# Patient Record
Sex: FEMALE | Race: WHITE | NOT HISPANIC OR LATINO | Employment: UNEMPLOYED | URBAN - METROPOLITAN AREA
[De-identification: names, ages, dates, MRNs, and addresses within clinical notes are randomized per-mention and may not be internally consistent; named-entity substitution may affect disease eponyms.]

---

## 2017-01-10 ENCOUNTER — GENERIC CONVERSION - ENCOUNTER (OUTPATIENT)
Dept: OTHER | Facility: OTHER | Age: 32
End: 2017-01-10

## 2018-01-15 NOTE — MISCELLANEOUS
Message   Recorded as Task   Date: 01/10/2017 02:56 PM, Created By: Renee Tracy   Task Name: Med Renewal Request   Assigned To: Renee Tracy   Regarding Patient: Mercedes Seen, Status: Active   CommentTalbert Spar - 10 Darwin 2017 2:56 PM     TASK CREATED  Caller: Self; [phone] (Home); [phone] (Work)  pt wants to go bk on byaz did not like nuvaring and husb is not going for vasectomy right now,rx to local pharm        Active Problems    1  Abnormal glucose in pregnancy, antepartum (648 83) (O99 810)   2  ASCUS favor benign (796 9)   3  Contraception (V25 9) (Z30 9)   4  Encounter for fetal anatomic survey (V28 81) (Z36)   5  Encounter for routine gynecological examination (V72 31) (Z01 419)   6  Encounter for routine postpartum follow-up (V24 2) (Z39 2)   7  Encounter for screening for risk of pre-term labor (V28 82) (Z36)   8  Gestational diabetes (648 80) (O24 419)   9  High-risk pregnancy supervision (V23 9) (O09 90)   10  History of gestational diabetes in prior pregnancy, currently pregnant (V23 49)    (O09 299,Z87 59)   6  History of  delivery, currently pregnant (V23 41) (O09 219)   12  Need for Tdap vaccination (V06 1) (Z23)   13  Pregnancy (V22 2) (Z33 1)   14  Pregnancy, subsequent (V22 1) (Z34 80)   15  Rh negative, antepartum (V23 89) (O09 899)    Current Meds   1  Beyaz 3-0 02-0 451 MG Oral Tablet (Drospiren-Eth Estrad-Levomefol); TAKE 1 TABLET   DAILY AS DIRECTED; Therapy: 02CDR5348 to (Evaluate:48Qpo7240); Last Rx:2015 Ordered   2  Beyaz 3-0 02-0 451 MG Oral Tablet; take 1 tablet every day as directed; Therapy: 81ENQ8506 to (Richard Parson)  Requested for: [id number]; Last   Rx:2015 Ordered   3  Flintstones Multivitamin CHEW;   Therapy: (Recorded:2014) to Recorded   4  Ibuprofen 600 MG Oral Tablet; Therapy: [id number] to Recorded    Allergies    1  Codeine Sulfate TABS    2  No Known Environmental Allergies   3   No Known Food Allergies    Plan  PMH: Contraceptives    · From  Beyaz 3-0 02-0 451 MG Oral Tablet take 1 tablet every day as directed  To Beyaz 3-0 02-0 451 MG Oral Tablet (Drospiren-Eth Estrad-Levomefol) TAKE 1  TABLET DAILY AS DIRECTED    Signatures   Electronically signed by : Alex Jacobson, ; Darwin 10 2017  2:56PM EST                       (Author)

## 2018-03-08 ENCOUNTER — OFFICE VISIT (OUTPATIENT)
Dept: OBGYN CLINIC | Facility: MEDICAL CENTER | Age: 33
End: 2018-03-08
Payer: COMMERCIAL

## 2018-03-08 VITALS
RESPIRATION RATE: 14 BRPM | SYSTOLIC BLOOD PRESSURE: 110 MMHG | DIASTOLIC BLOOD PRESSURE: 74 MMHG | WEIGHT: 134.2 LBS | BODY MASS INDEX: 26.35 KG/M2 | HEIGHT: 60 IN

## 2018-03-08 DIAGNOSIS — Z01.419 ENCOUNTER FOR ANNUAL ROUTINE GYNECOLOGICAL EXAMINATION: Primary | ICD-10-CM

## 2018-03-08 DIAGNOSIS — Z01.419 ENCOUNTER FOR GYNECOLOGICAL EXAMINATION WITHOUT ABNORMAL FINDING: ICD-10-CM

## 2018-03-08 PROCEDURE — G0145 SCR C/V CYTO,THINLAYER,RESCR: HCPCS | Performed by: OBSTETRICS & GYNECOLOGY

## 2018-03-08 PROCEDURE — 87624 HPV HI-RISK TYP POOLED RSLT: CPT | Performed by: OBSTETRICS & GYNECOLOGY

## 2018-03-08 PROCEDURE — 99395 PREV VISIT EST AGE 18-39: CPT | Performed by: OBSTETRICS & GYNECOLOGY

## 2018-03-08 NOTE — PROGRESS NOTES
Assessment/Plan:    No problem-specific Assessment & Plan notes found for this encounter  Diagnoses and all orders for this visit:    Encounter for annual routine gynecological examination  -     Liquid-based pap, screening          Subjective:      Patient ID: Jimena Carpenter is a 34 y o  female  Patient is a 39-year-old female, para 2, vasectomy here for yearly gynecologic exam without complaint  Review of systems is negative for vulvar vaginal or anal irritation  Negative for pelvic or abdominal pain  Negative for breast complaints  Past medical history is negative  Past surgical history is significant for an appendectomy and a   Current medications none no known drug allergies  Family history is significant for a paternal aunt with breast cancer  Gynecologic Exam   The patient's pertinent negatives include no pelvic pain or vaginal discharge  Pertinent negatives include no abdominal pain  The following portions of the patient's history were reviewed and updated as appropriate: past social history and problem list     Review of Systems   Gastrointestinal: Negative for abdominal pain  Genitourinary: Negative for pelvic pain, vaginal discharge and vaginal pain  Objective:      /74 (BP Location: Left arm, Patient Position: Sitting, Cuff Size: Standard)   Resp 14   Ht 5' (1 524 m)   Wt 60 9 kg (134 lb 3 2 oz)   LMP 2018 (Exact Date)   BMI 26 21 kg/m²          Physical Exam   Constitutional: She is oriented to person, place, and time  She appears well-developed and well-nourished  HENT:   Head: Normocephalic  Eyes: Pupils are equal, round, and reactive to light  Cardiovascular: Normal rate  Pulmonary/Chest: Effort normal and breath sounds normal  No respiratory distress  She has no wheezes  She has no rales  She exhibits no tenderness  Abdominal: Soft  She exhibits no distension and no mass  There is no tenderness   There is no rebound and no guarding  Genitourinary: Vagina normal and uterus normal  No vaginal discharge found  Neurological: She is alert and oriented to person, place, and time  Skin: Skin is warm  Psychiatric: She has a normal mood and affect   Her behavior is normal  Judgment and thought content normal

## 2018-03-13 LAB — HPV RRNA GENITAL QL NAA+PROBE: NORMAL

## 2018-03-15 LAB
LAB AP GYN PRIMARY INTERPRETATION: NORMAL
LAB AP LMP: NORMAL
Lab: NORMAL

## 2018-03-30 ENCOUNTER — TELEPHONE (OUTPATIENT)
Dept: OBGYN CLINIC | Facility: CLINIC | Age: 33
End: 2018-03-30

## 2018-03-30 NOTE — TELEPHONE ENCOUNTER
----- Message from Susu Raza MD sent at 3/15/2018  5:22 PM EDT -----  Please notify patient her Pap was normal on her screen for HPV was negative

## 2018-04-26 NOTE — TELEPHONE ENCOUNTER
I spoke to patient today to give her test results for her pap smear and HPV  In  which there were negative

## 2018-05-17 ENCOUNTER — OFFICE VISIT (OUTPATIENT)
Dept: FAMILY MEDICINE CLINIC | Facility: CLINIC | Age: 33
End: 2018-05-17
Payer: COMMERCIAL

## 2018-05-17 VITALS
HEART RATE: 64 BPM | SYSTOLIC BLOOD PRESSURE: 112 MMHG | TEMPERATURE: 98.6 F | WEIGHT: 130 LBS | DIASTOLIC BLOOD PRESSURE: 70 MMHG | HEIGHT: 60 IN | OXYGEN SATURATION: 98 % | RESPIRATION RATE: 16 BRPM | BODY MASS INDEX: 25.52 KG/M2

## 2018-05-17 DIAGNOSIS — Z86.32 HISTORY OF GESTATIONAL DIABETES: ICD-10-CM

## 2018-05-17 DIAGNOSIS — N91.1 AMENORRHEA, SECONDARY: ICD-10-CM

## 2018-05-17 DIAGNOSIS — Z00.00 ROUTINE ADULT HEALTH MAINTENANCE: Primary | ICD-10-CM

## 2018-05-17 DIAGNOSIS — L68.0 HIRSUTISM: ICD-10-CM

## 2018-05-17 PROCEDURE — 1036F TOBACCO NON-USER: CPT | Performed by: FAMILY MEDICINE

## 2018-05-17 PROCEDURE — 92551 PURE TONE HEARING TEST AIR: CPT | Performed by: FAMILY MEDICINE

## 2018-05-17 PROCEDURE — 3008F BODY MASS INDEX DOCD: CPT | Performed by: FAMILY MEDICINE

## 2018-05-17 PROCEDURE — 99173 VISUAL ACUITY SCREEN: CPT | Performed by: FAMILY MEDICINE

## 2018-05-17 PROCEDURE — 99214 OFFICE O/P EST MOD 30 MIN: CPT | Performed by: FAMILY MEDICINE

## 2018-05-17 PROCEDURE — 99385 PREV VISIT NEW AGE 18-39: CPT | Performed by: FAMILY MEDICINE

## 2018-05-17 RX ORDER — DROSPIRENONE, ETHINYL ESTRADIOL AND LEVOMEFOLATE CALCIUM AND LEVOMEFOLATE CALCIUM 3-0.02(24)
1 KIT ORAL DAILY
COMMUNITY
Start: 2015-10-08 | End: 2018-05-17 | Stop reason: ALTCHOICE

## 2018-05-17 NOTE — PROGRESS NOTES
Assessment/Plan:         Diagnoses and all orders for this visit:    Routine adult health maintenance  -     CBC and differential; Future  -     Comprehensive metabolic panel; Future  -     HEMOGLOBIN A1C W/ EAG ESTIMATION; Future  -     Lipid panel; Future  -     Estradiol; Future  -     Testosterone; Future  -     TSH, 3rd generation; Future  -     Lyme Antibody Profile with reflex to WB; Future  -     Follicle stimulating hormone; Future  -     Luteinizing hormone; Future    Hirsutism  -     CBC and differential; Future  -     Comprehensive metabolic panel; Future  -     HEMOGLOBIN A1C W/ EAG ESTIMATION; Future  -     Lipid panel; Future  -     Estradiol; Future  -     Testosterone; Future  -     TSH, 3rd generation; Future  -     Lyme Antibody Profile with reflex to WB; Future  -     Follicle stimulating hormone; Future  -     Luteinizing hormone; Future    Amenorrhea, secondary  -     CBC and differential; Future  -     Comprehensive metabolic panel; Future  -     HEMOGLOBIN A1C W/ EAG ESTIMATION; Future  -     Lipid panel; Future  -     Estradiol; Future  -     Testosterone; Future  -     TSH, 3rd generation; Future  -     Lyme Antibody Profile with reflex to WB; Future  -     Follicle stimulating hormone; Future  -     Luteinizing hormone; Future    History of gestational diabetes  -     CBC and differential; Future  -     Comprehensive metabolic panel; Future  -     HEMOGLOBIN A1C W/ EAG ESTIMATION; Future  -     Lipid panel; Future  -     Estradiol; Future  -     Testosterone; Future  -     TSH, 3rd generation; Future  -     Lyme Antibody Profile with reflex to WB; Future  -     Follicle stimulating hormone; Future  -     Luteinizing hormone; Future    Other orders  -     Discontinue: Drospiren-Eth Estrad-Levomefol (BEYAZ) 3-0 02-0 451 MG TABS; Take 1 tablet by mouth daily  -     Multiple Vitamins-Minerals (MULTIVITAMIN ADULT PO);  Take by mouth          Subjective:      Patient ID: Sheri Robles is a 34 y o  female  Here to establish care  c/o lump L side of neck, also no menses since February, only had some spotting last month  Late menarche at 24yo  Had stopped the pill in Sept/Oct 2017, and last menses was   Last pap was around that time as well  paps have been normal  Had no problem conceiving, but had thin cervix with the first preg 6 yo ago, 6 weeks bed rest until delivery, then needed to get shots weekly until 32 weeks to prevent  labor  The longest she has gone without a period is 6 months, occ bloating, cramps  Trying to improve her diet   had vasectomy in October  Noticing more hair growth on her arms, and around her lips, stomach area  Asks about PCOS, no hair loss  Has not checked a pregnancy test  No h/o thyroid or HTN issues in pregangy, but had GDM in at least the first pregnancy  No nipple discharge, no breast changes        The following portions of the patient's history were reviewed and updated as appropriate: allergies, current medications, past family history, past medical history, past social history, past surgical history and problem list     Review of Systems   Constitutional: Negative  HENT: Negative  Eyes: Negative  Respiratory: Negative  Cardiovascular: Negative  Gastrointestinal: Negative  Endocrine: Negative  Genitourinary: Positive for menstrual problem  Musculoskeletal: Negative  Skin: Negative  Allergic/Immunologic: Negative  Neurological: Negative  Hematological: Positive for adenopathy  Does not bruise/bleed easily  Psychiatric/Behavioral: Negative  Objective:      /70 (BP Location: Left arm, Patient Position: Sitting, Cuff Size: Standard)   Pulse 64   Temp 98 6 °F (37 °C)   Resp 16   Ht 5' (1 524 m)   Wt 59 kg (130 lb)   LMP 2018   SpO2 98%   BMI 25 39 kg/m²          Physical Exam   Constitutional: She is oriented to person, place, and time  She appears well-developed and well-nourished  HENT:   Right Ear: External ear normal    Left Ear: External ear normal    Eyes: Pupils are equal, round, and reactive to light  Neck: Normal range of motion  Neck supple  No thyromegaly present  Cardiovascular: Normal rate, regular rhythm and normal heart sounds  Pulmonary/Chest: Effort normal and breath sounds normal    Abdominal: Soft  Bowel sounds are normal    Musculoskeletal: Normal range of motion  Lymphadenopathy:     She has cervical adenopathy (Posterior L pea size lymph node, non tender, mobile)  Neurological: She is alert and oriented to person, place, and time  Skin: Skin is warm  Increased hair growth over the arms, belly, and around the lips   Psychiatric: She has a normal mood and affect   Her behavior is normal  Judgment and thought content normal

## 2018-12-26 ENCOUNTER — OFFICE VISIT (OUTPATIENT)
Dept: FAMILY MEDICINE CLINIC | Facility: CLINIC | Age: 33
End: 2018-12-26
Payer: COMMERCIAL

## 2018-12-26 VITALS
OXYGEN SATURATION: 97 % | WEIGHT: 129 LBS | TEMPERATURE: 99.4 F | DIASTOLIC BLOOD PRESSURE: 78 MMHG | RESPIRATION RATE: 16 BRPM | HEART RATE: 88 BPM | HEIGHT: 60 IN | BODY MASS INDEX: 25.32 KG/M2 | SYSTOLIC BLOOD PRESSURE: 116 MMHG

## 2018-12-26 DIAGNOSIS — J32.9 SINUSITIS, UNSPECIFIED CHRONICITY, UNSPECIFIED LOCATION: Primary | ICD-10-CM

## 2018-12-26 PROCEDURE — 99213 OFFICE O/P EST LOW 20 MIN: CPT | Performed by: INTERNAL MEDICINE

## 2018-12-26 RX ORDER — AMOXICILLIN 500 MG/1
500 CAPSULE ORAL EVERY 8 HOURS SCHEDULED
Qty: 30 CAPSULE | Refills: 0 | Status: SHIPPED | OUTPATIENT
Start: 2018-12-26 | End: 2019-01-05

## 2018-12-26 NOTE — PROGRESS NOTES
Assessment/Plan:         Diagnoses and all orders for this visit:    Sinusitis, unspecified chronicity, unspecified location  -     amoxicillin (AMOXIL) 500 mg capsule; Take 1 capsule (500 mg total) by mouth every 8 (eight) hours for 10 days          Subjective:      Patient ID: Kurt Rodriguez is a 28 y o  female  Ill x 3 days  Tried niquil   +sore throat +fever +head congestion ? Post nasal drip - cough  +swollen glands  Cough   Associated symptoms include a fever, headaches and a sore throat  The following portions of the patient's history were reviewed and updated as appropriate:   She  has a past medical history of Gestational diabetes and Type A blood, Rh negative  She There are no active problems to display for this patient  She  has a past surgical history that includes Appendectomy (2008)  Her family history includes Breast cancer in her paternal aunt; Diabetes in her maternal aunt, maternal grandmother, maternal uncle, and mother; Hypertension in her father, paternal grandfather, and paternal grandmother  She  reports that she has never smoked  She has never used smokeless tobacco  She reports that she does not drink alcohol or use drugs  Current Outpatient Prescriptions   Medication Sig Dispense Refill    amoxicillin (AMOXIL) 500 mg capsule Take 1 capsule (500 mg total) by mouth every 8 (eight) hours for 10 days 30 capsule 0    Multiple Vitamins-Minerals (MULTIVITAMIN ADULT PO) Take by mouth       No current facility-administered medications for this visit  Current Outpatient Prescriptions on File Prior to Visit   Medication Sig    Multiple Vitamins-Minerals (MULTIVITAMIN ADULT PO) Take by mouth     No current facility-administered medications on file prior to visit  She is allergic to codeine       Review of Systems   Constitutional: Positive for fever  HENT: Positive for congestion and sore throat  Respiratory: Negative for cough  Cardiovascular: Negative  Neurological: Positive for headaches  Objective:      /78 (BP Location: Left arm, Patient Position: Sitting, Cuff Size: Standard)   Pulse 88   Temp 99 4 °F (37 4 °C) (Tympanic)   Resp 16   Ht 5' (1 524 m)   Wt 58 5 kg (129 lb)   SpO2 97%   BMI 25 19 kg/m²          Physical Exam   Constitutional: She appears well-developed and well-nourished  HENT:   Head: Normocephalic and atraumatic  Right Ear: External ear normal    Left Ear: External ear normal    Nose: Nose normal    Mouth/Throat: Oropharynx is clear and moist    Neck: Normal range of motion  Neck supple  Cardiovascular: Normal rate, regular rhythm and normal heart sounds  Pulmonary/Chest: Effort normal and breath sounds normal    Abdominal: Soft   Bowel sounds are normal

## 2020-01-30 ENCOUNTER — ANNUAL EXAM (OUTPATIENT)
Dept: OBGYN CLINIC | Facility: MEDICAL CENTER | Age: 35
End: 2020-01-30
Payer: COMMERCIAL

## 2020-01-30 VITALS — SYSTOLIC BLOOD PRESSURE: 132 MMHG | WEIGHT: 132 LBS | BODY MASS INDEX: 25.78 KG/M2 | DIASTOLIC BLOOD PRESSURE: 82 MMHG

## 2020-01-30 DIAGNOSIS — Z01.419 ENCOUNTER FOR GYNECOLOGICAL EXAMINATION (GENERAL) (ROUTINE) WITHOUT ABNORMAL FINDINGS: Primary | ICD-10-CM

## 2020-01-30 PROCEDURE — 99395 PREV VISIT EST AGE 18-39: CPT | Performed by: NURSE PRACTITIONER

## 2020-01-30 NOTE — PROGRESS NOTES
Assessment/Plan:    Encounter for gynecological examination (general) (routine) without abnormal findings  Normal findings on routine gyn exam  Advised monthly SBE, annual CBE and baseline screening mammo at age 35  Reviewed ASCCP guidelines and recommended pap with cotesting q3 yrs for this low risk patient; this was noted to be up to date  STI testing was offered and the patient declines; she reports low risk  The patient reports vas for contra  Diet/activity recommendations:  Encouraged daily Ca++ and vitamin D intake as well as daily weight bearing exercise for promotion of bone health  RTO in one year or sooner PRN  Diagnoses and all orders for this visit:    Encounter for gynecological examination (general) (routine) without abnormal findings          Subjective:      Patient ID: Chidi Rodriguez is a 27 y o  female  This patient presents for routine annual gyn exam    Medically stable  She denies acute gyn complaints  Menses are regular and light to mod  She denies pelvic pain, breast concerns, abnormal discharge, bowel/bladder dysfunction, depression/anxiety   and monogamous  She denies STI concerns  Vas for contraception  Kids are well - son is 15 and daughter is 4      The following portions of the patient's history were reviewed and updated as appropriate: allergies, current medications, past family history, past medical history, past social history, past surgical history and problem list     Review of Systems   Constitutional: Negative  Respiratory: Negative  Cardiovascular: Negative  Gastrointestinal: Negative  Genitourinary: Negative  Musculoskeletal: Negative  Skin: Negative  Neurological: Negative  Psychiatric/Behavioral: Negative  Objective:      /82   Wt 59 9 kg (132 lb)   LMP 01/07/2020   BMI 25 78 kg/m²          Physical Exam   Constitutional: She is oriented to person, place, and time  She appears well-developed and well-nourished  HENT:   Head: Normocephalic and atraumatic  Eyes: Pupils are equal, round, and reactive to light  EOM are normal    Neck: Normal range of motion  Neck supple  No thyromegaly present  Cardiovascular: Normal rate, regular rhythm and normal heart sounds  Pulmonary/Chest: Effort normal and breath sounds normal  No respiratory distress  She has no wheezes  She has no rales  She exhibits no mass, no tenderness and no deformity  Right breast exhibits no inverted nipple, no mass, no nipple discharge, no skin change and no tenderness  Left breast exhibits no inverted nipple, no mass, no nipple discharge, no skin change and no tenderness  No breast tenderness or discharge  Breasts are symmetrical    Abdominal: Soft  She exhibits no distension and no mass  There is no splenomegaly or hepatomegaly  There is no tenderness  There is no rebound and no guarding  Genitourinary: Rectum normal, vagina normal and uterus normal  No breast tenderness or discharge  No labial fusion  There is no rash, tenderness, lesion or injury on the right labia  There is no rash, tenderness, lesion or injury on the left labia  Cervix exhibits no motion tenderness, no discharge and no friability  Right adnexum displays no mass, no tenderness and no fullness  Left adnexum displays no mass, no tenderness and no fullness  No erythema, tenderness or bleeding in the vagina  No foreign body in the vagina  No vaginal discharge found  Musculoskeletal: Normal range of motion  Lymphadenopathy:     She has no cervical adenopathy  She has no axillary adenopathy  Neurological: She is alert and oriented to person, place, and time  No cranial nerve deficit  Skin: Skin is warm and dry  No rash noted  No cyanosis  Nails show no clubbing  Psychiatric: She has a normal mood and affect   Her speech is normal and behavior is normal  Judgment and thought content normal  Cognition and memory are normal

## 2020-01-30 NOTE — ASSESSMENT & PLAN NOTE
Normal findings on routine gyn exam  Advised monthly SBE, annual CBE and baseline screening mammo at age 35  Reviewed ASCCP guidelines and recommended pap with cotesting q3 yrs for this low risk patient; this was noted to be up to date  STI testing was offered and the patient declines; she reports low risk  The patient reports vas for contra  Diet/activity recommendations:  Encouraged daily Ca++ and vitamin D intake as well as daily weight bearing exercise for promotion of bone health  RTO in one year or sooner PRN

## 2020-02-06 ENCOUNTER — OFFICE VISIT (OUTPATIENT)
Dept: FAMILY MEDICINE CLINIC | Facility: CLINIC | Age: 35
End: 2020-02-06
Payer: COMMERCIAL

## 2020-02-06 VITALS
OXYGEN SATURATION: 98 % | HEIGHT: 60 IN | WEIGHT: 135 LBS | DIASTOLIC BLOOD PRESSURE: 70 MMHG | HEART RATE: 78 BPM | TEMPERATURE: 97.1 F | BODY MASS INDEX: 26.5 KG/M2 | SYSTOLIC BLOOD PRESSURE: 118 MMHG

## 2020-02-06 DIAGNOSIS — Z86.32 HISTORY OF GESTATIONAL DIABETES: ICD-10-CM

## 2020-02-06 DIAGNOSIS — L68.0 HIRSUTISM: ICD-10-CM

## 2020-02-06 DIAGNOSIS — Z00.00 ANNUAL PHYSICAL EXAM: Primary | ICD-10-CM

## 2020-02-06 DIAGNOSIS — S93.491A SPRAIN OF POSTERIOR TALOFIBULAR LIGAMENT OF RIGHT ANKLE, INITIAL ENCOUNTER: ICD-10-CM

## 2020-02-06 PROBLEM — O24.419 GESTATIONAL DIABETES: Status: ACTIVE | Noted: 2020-02-06

## 2020-02-06 PROCEDURE — 99173 VISUAL ACUITY SCREEN: CPT | Performed by: FAMILY MEDICINE

## 2020-02-06 PROCEDURE — 99395 PREV VISIT EST AGE 18-39: CPT | Performed by: FAMILY MEDICINE

## 2020-02-06 PROCEDURE — 92551 PURE TONE HEARING TEST AIR: CPT | Performed by: FAMILY MEDICINE

## 2020-02-06 PROCEDURE — 1036F TOBACCO NON-USER: CPT | Performed by: FAMILY MEDICINE

## 2020-02-06 PROCEDURE — 3008F BODY MASS INDEX DOCD: CPT | Performed by: FAMILY MEDICINE

## 2020-02-06 PROCEDURE — 99214 OFFICE O/P EST MOD 30 MIN: CPT | Performed by: FAMILY MEDICINE

## 2020-02-06 NOTE — PROGRESS NOTES
Assessment/Plan:    1  Annual physical exam  -     CBC and differential; Future  -     Comprehensive metabolic panel; Future  -     Lipid panel; Future    2  Hirsutism  Comments:  check labs, consider endo or GYN consult  Orders:  -     TSH, 3rd generation; Future  -     Testosterone, free, total; Future  -     Estradiol; Future  -     HEMOGLOBIN A1C W/ EAG ESTIMATION; Future  -     Follicle stimulating hormone; Future  -     Luteinizing hormone; Future  -     Prolactin; Future  -     Cortisol Level, AM Specimen; Future    3  Sprain of posterior talofibular ligament of right ankle, initial encounter    4  History of gestational diabetes          Patient Instructions       Wellness Visit for Adults   AMBULATORY CARE:   A wellness visit  is when you see your healthcare provider to get screened for health problems  You can also get advice on how to stay healthy  Write down your questions so you remember to ask them  Ask your healthcare provider how often you should have a wellness visit  What happens at a wellness visit:  Your healthcare provider will ask about your health, and your family history of health problems  This includes high blood pressure, heart disease, and cancer  He or she will ask if you have symptoms that concern you, if you smoke, and about your mood  You may also be asked about your intake of medicines, supplements, food, and alcohol  Any of the following may be done:  · Your weight  will be checked  Your height may also be checked so your body mass index (BMI) can be calculated  Your BMI shows if you are at a healthy weight  · Your blood pressure  and heart rate will be checked  Your temperature may also be checked  · Blood and urine tests  may be done  Blood tests may be done to check your cholesterol levels  Abnormal cholesterol levels increase your risk for heart disease and stroke  You may also need a blood or urine test to check for diabetes if you are at increased risk   Urine tests may be done to look for signs of an infection or kidney disease  · A physical exam  includes checking your heartbeat and lungs with a stethoscope  Your healthcare provider may also check your skin to look for sun damage  · Screening tests  may be recommended  A screening test is done to check for diseases that may not cause symptoms  The screening tests you may need depend on your age, gender, family history, and lifestyle habits  For example, colorectal screening may be recommended if you are 48years old or older  Screening tests you need if you are a woman:   · A Pap smear  is used to screen for cervical cancer  Pap smears are usually done every 3 to 5 years depending on your age  You may need them more often if you have had abnormal Pap smear test results in the past  Ask your healthcare provider how often you should have a Pap smear  · A mammogram  is an x-ray of your breasts to screen for breast cancer  Experts recommend mammograms every 2 years starting at age 48 years  You may need a mammogram at age 52 years or younger if you have an increased risk for breast cancer  Talk to your healthcare provider about when you should start having mammograms and how often you need them  Vaccines you may need:   · Get an influenza vaccine  every year  The influenza vaccine protects you from the flu  Several types of viruses cause the flu  The viruses change over time, so new vaccines are made each year  · Get a tetanus-diphtheria (Td) booster vaccine  every 10 years  This vaccine protects you against tetanus and diphtheria  Tetanus is a severe infection that may cause painful muscle spasms and lockjaw  Diphtheria is a severe bacterial infection that causes a thick covering in the back of your mouth and throat  · Get a human papillomavirus (HPV) vaccine  if you are female and aged 23 to 32 or male 23 to 24 and never received it  This vaccine protects you from HPV infection   HPV is the most common infection spread by sexual contact  HPV may also cause vaginal, penile, and anal cancers  · Get a pneumococcal vaccine  if you are aged 72 years or older  The pneumococcal vaccine is an injection given to protect you from pneumococcal disease  Pneumococcal disease is an infection caused by pneumococcal bacteria  The infection may cause pneumonia, meningitis, or an ear infection  · Get a shingles vaccine  if you are aged 61 or older, even if you have had shingles before  The shingles vaccine is an injection to protect you from the varicella-zoster virus  This is the same virus that causes chickenpox  Shingles is a painful rash that develops in people who had chickenpox or have been exposed to the virus  How to eat healthy:  My Plate is a model for planning healthy meals  It shows the types and amounts of foods that should go on your plate  Fruits and vegetables make up about half of your plate, and grains and protein make up the other half  A serving of dairy is included on the side of your plate  The amount of calories and serving sizes you need depends on your age, gender, weight, and height  Examples of healthy foods are listed below:  · Eat a variety of vegetables  such as dark green, red, and orange vegetables  You can also include canned vegetables low in sodium (salt) and frozen vegetables without added butter or sauces  · Eat a variety of fresh fruits , canned fruit in 100% juice, frozen fruit, and dried fruit  · Include whole grains  At least half of the grains you eat should be whole grains  Examples include whole-wheat bread, wheat pasta, brown rice, and whole-grain cereals such as oatmeal     · Eat a variety of protein foods such as seafood (fish and shellfish), lean meat, and poultry without skin (turkey and chicken)  Examples of lean meats include pork leg, shoulder, or tenderloin, and beef round, sirloin, tenderloin, and extra lean ground beef   Other protein foods include eggs and egg substitutes, beans, peas, soy products, nuts, and seeds  · Choose low-fat dairy products such as skim or 1% milk or low-fat yogurt, cheese, and cottage cheese  · Limit unhealthy fats  such as butter, hard margarine, and shortening  Exercise:  Exercise at least 30 minutes per day on most days of the week  Some examples of exercise include walking, biking, dancing, and swimming  You can also fit in more physical activity by taking the stairs instead of the elevator or parking farther away from stores  Include muscle strengthening activities 2 days each week  Regular exercise provides many health benefits  It helps you manage your weight, and decreases your risk for type 2 diabetes, heart disease, stroke, and high blood pressure  Exercise can also help improve your mood  Ask your healthcare provider about the best exercise plan for you  General health and safety guidelines:   · Do not smoke  Nicotine and other chemicals in cigarettes and cigars can cause lung damage  Ask your healthcare provider for information if you currently smoke and need help to quit  E-cigarettes or smokeless tobacco still contain nicotine  Talk to your healthcare provider before you use these products  · Limit alcohol  A drink of alcohol is 12 ounces of beer, 5 ounces of wine, or 1½ ounces of liquor  · Lose weight, if needed  Being overweight increases your risk of certain health conditions  These include heart disease, high blood pressure, type 2 diabetes, and certain types of cancer  · Protect your skin  Do not sunbathe or use tanning beds  Use sunscreen with a SPF 15 or higher  Apply sunscreen at least 15 minutes before you go outside  Reapply sunscreen every 2 hours  Wear protective clothing, hats, and sunglasses when you are outside  · Drive safely  Always wear your seatbelt  Make sure everyone in your car wears a seatbelt  A seatbelt can save your life if you are in an accident   Do not use your cell phone when you are driving  This could distract you and cause an accident  Pull over if you need to make a call or send a text message  · Practice safe sex  Use latex condoms if are sexually active and have more than one partner  Your healthcare provider may recommend screening tests for sexually transmitted infections (STIs)  · Wear helmets, lifejackets, and protective gear  Always wear a helmet when you ride a bike or motorcycle, go skiing, or play sports that could cause a head injury  Wear protective equipment when you play sports  Wear a lifejacket when you are on a boat or doing water sports  © 2017 [facility] Information is for End User's use only and may not be sold, redistributed or otherwise used for commercial purposes  All illustrations and images included in CareNotes® are the copyrighted property of [employer] , [employer]  or Germán Irvin  The above information is an  only  It is not intended as medical advice for individual conditions or treatments  Talk to your doctor, nurse or pharmacist before following any medical regimen to see if it is safe and effective for you  Return if symptoms worsen or fail to improve  Subjective:      Patient ID: Roxanne Hirsch is a 27 y o  female  Chief Complaint   Patient presents with    Annual Exam    Ankle Pain       Here for R ankle pain, 1 1/2 weeks ago while exercising at home, without shoes  Didn't hurt until the next day  Able to bear weight, but more swollen in the evening, driving a lot with kids schedules  Hasn't tried a brace  Also asks about hormone check  Notes hair growth on her abdomen, after her first pregnancy 12 years ago, then it subsided and has started up again on the abd and the chin, hairy arms as well  H/o GDM with her first pregnancy  Second pregnancy 4 yrs ago, she was more conscious of her sugar  Has cut out soda, exercises, no change in her weight despite changed habits   Notes some trouble conceiving her 2yo dtr, but did not use any additional meds or treatments  Periods have always been abnormal, no OCP, pt   has vasectomy, denies buffalo hump  The following portions of the patient's history were reviewed and updated as appropriate:  past social history    Review of Systems   Constitutional: Negative for fatigue and fever  HENT: Negative for congestion  Eyes: Negative for visual disturbance  Respiratory: Negative for chest tightness and shortness of breath  Cardiovascular: Positive for leg swelling  Negative for chest pain and palpitations  Gastrointestinal: Negative for abdominal pain  Genitourinary: Positive for menstrual problem (irreg)  Negative for difficulty urinating  Musculoskeletal: Positive for arthralgias and joint swelling  Neurological: Negative for numbness and headaches  Hematological: Does not bruise/bleed easily  Psychiatric/Behavioral: Negative for dysphoric mood and sleep disturbance  The patient is not nervous/anxious  Current Outpatient Medications   Medication Sig Dispense Refill    Multiple Vitamins-Minerals (MULTIVITAMIN ADULT PO) Take by mouth       No current facility-administered medications for this visit  Objective:    /70 (BP Location: Right arm, Patient Position: Sitting, Cuff Size: Standard)   Pulse 78   Temp (!) 97 1 °F (36 2 °C)   Ht 5' (1 524 m)   Wt 61 2 kg (135 lb)   LMP 01/07/2020   SpO2 98%   BMI 26 37 kg/m²      Physical Exam   Constitutional: She is oriented to person, place, and time  She appears well-developed and well-nourished  Cardiovascular: Normal rate, regular rhythm and normal heart sounds  Pulmonary/Chest: Effort normal and breath sounds normal  She exhibits no tenderness  Abdominal: Soft  She exhibits no distension  There is no tenderness     Musculoskeletal: She exhibits tenderness (below R late malleolus, no bony tenderness anywhere on the foot, good ROM but pain with inversion R side)  She exhibits no deformity  Neurological: She is alert and oriented to person, place, and time  Skin: Skin is warm  Psychiatric: She has a normal mood and affect   Her behavior is normal  Judgment and thought content normal            Dequan Kennedy MD

## 2020-02-06 NOTE — PATIENT INSTRUCTIONS

## 2020-02-06 NOTE — PROGRESS NOTES
[address] 57    NAME: Rena Alonso  AGE: 27 y o  SEX: female  : 1985     DATE: 2020     Assessment and Plan:     Problem List Items Addressed This Visit        Other    History of gestational diabetes      Other Visit Diagnoses     Annual physical exam    -  Primary    Relevant Orders    CBC and differential    Comprehensive metabolic panel    Lipid panel    Hirsutism        check labs, consider endo or GYN consult    Relevant Orders    TSH, 3rd generation    Testosterone, free, total    Estradiol    HEMOGLOBIN A1C W/ EAG ESTIMATION    Follicle stimulating hormone    Luteinizing hormone    Prolactin    Cortisol Level, AM Specimen    Sprain of posterior talofibular ligament of right ankle, initial encounter              Immunizations and preventive care screenings were discussed with patient today  Appropriate education was printed on patient's after visit summary  Counseling:  Dental Health: discussed importance of regular tooth brushing, flossing, and dental visits  · Exercise: the importance of regular exercise/physical activity was discussed  Recommend exercise 3-5 times per week for at least 30 minutes  Return if symptoms worsen or fail to improve  Chief Complaint:     Chief Complaint   Patient presents with    Annual Exam    Ankle Pain      History of Present Illness:     Adult Annual Physical   Patient here for a comprehensive physical exam  The patient reports problems - R ankle pain  Diet and Physical Activity  · Diet/Nutrition: well balanced diet, low carb diet and consuming 3-5 servings of fruits/vegetables daily  · Exercise: walking, moderate cardiovascular exercise, strength training exercises, 3-4 times a week on average and 30-60 minutes on average        Depression Screening  PHQ-9 Depression Screening    PHQ-9:    Frequency of the following problems over the past two weeks:       Little interest or pleasure in doing things:  0 - not at all  Feeling down, depressed, or hopeless:  0 - not at all  PHQ-2 Score:  0       General Health  · Sleep: sleeps well and gets 7-8 hours of sleep on average  · Hearing: normal - bilateral   · Vision: goes for regular eye exams, wears glasses and wears contacts  · Dental: regular dental visits and brushes teeth twice daily  /GYN Health  · Last menstrual period: 1/14/20  · Contraceptive method:  vasectomy  · History of STDs?: no      Review of Systems:     Review of Systems   Constitutional: Negative for fatigue and fever  HENT: Negative for congestion  Eyes: Negative for visual disturbance  Respiratory: Negative for chest tightness and shortness of breath  Cardiovascular: Negative for chest pain and palpitations  Gastrointestinal: Negative for abdominal pain  Genitourinary: Negative for difficulty urinating  Musculoskeletal: Positive for arthralgias  Neurological: Negative for headaches  Hematological: Does not bruise/bleed easily  Psychiatric/Behavioral: Negative for sleep disturbance        Past Medical History:     Past Medical History:   Diagnosis Date    Gestational diabetes     Type A blood, Rh negative       Past Surgical History:     Past Surgical History:   Procedure Laterality Date    APPENDECTOMY  2008      Social History:     Social History     Socioeconomic History    Marital status: /Civil Union     Spouse name: None    Number of children: None    Years of education: None    Highest education level: None   Occupational History    None   Social Needs    Financial resource strain: None    Food insecurity:     Worry: None     Inability: None    Transportation needs:     Medical: None     Non-medical: None   Tobacco Use    Smoking status: Never Smoker    Smokeless tobacco: Never Used   Substance and Sexual Activity    Alcohol use: No     Comment: Per [employer]: Alcohol use    Drug use: No    Sexual activity: Yes     Partners: Male     Birth control/protection: Male Sterilization     Comment:    Lifestyle    Physical activity:     Days per week: None     Minutes per session: None    Stress: None   Relationships    Social connections:     Talks on phone: None     Gets together: None     Attends Orthodox service: None     Active member of club or organization: None     Attends meetings of clubs or organizations: None     Relationship status: None    Intimate partner violence:     Fear of current or ex partner: None     Emotionally abused: None     Physically abused: None     Forced sexual activity: None   Other Topics Concern    None   Social History Narrative    Always uses a seatbelt    Coffee    Per [employer]: Single      Family History:     Family History   Problem Relation Age of Onset    Diabetes Mother     Hypertension Father     Diabetes Maternal Grandmother     Breast cancer Paternal Aunt     Hypertension Paternal Grandmother     Hypertension Paternal Grandfather     Diabetes Maternal Aunt     Diabetes Maternal Uncle       Current Medications:     Current Outpatient Medications   Medication Sig Dispense Refill    Multiple Vitamins-Minerals (MULTIVITAMIN ADULT PO) Take by mouth       No current facility-administered medications for this visit  Allergies: Allergies   Allergen Reactions    Codeine Vomiting     Reaction Date: 08Feb2012;       Physical Exam:     /70 (BP Location: Right arm, Patient Position: Sitting, Cuff Size: Standard)   Pulse 78   Temp (!) 97 1 °F (36 2 °C)   Ht 5' (1 524 m)   Wt 61 2 kg (135 lb)   LMP 01/07/2020   SpO2 98%   BMI 26 37 kg/m²     Physical Exam   Constitutional: She is oriented to person, place, and time  She appears well-developed and well-nourished     HENT:   Right Ear: External ear normal    Left Ear: External ear normal    Mouth/Throat: Oropharynx is clear and moist    Eyes: Pupils are equal, round, and reactive to light  Conjunctivae and EOM are normal    Neck: Normal range of motion  Neck supple  Cardiovascular: Normal rate, regular rhythm and normal heart sounds  Pulmonary/Chest: Effort normal and breath sounds normal    Abdominal: Soft  Bowel sounds are normal    Musculoskeletal: Normal range of motion  She exhibits tenderness (R lat malleolus, tender below, no bony tenderness)  Neurological: She is alert and oriented to person, place, and time  She has normal reflexes  She displays normal reflexes  Coordination normal    Skin: Skin is warm  Psychiatric: She has a normal mood and affect  Her behavior is normal  Judgment and thought content normal    Vitals reviewed          Hearing Screening    125Hz 250Hz 500Hz 1000Hz 2000Hz 3000Hz 4000Hz 6000Hz 8000Hz   Right ear:   20 20 20  20     Left ear:   20 20 20  20        Visual Acuity Screening    Right eye Left eye Both eyes   Without correction:      With correction: 20/20 20/20 20/20       MD Don[facility]

## 2020-07-11 ENCOUNTER — APPOINTMENT (OUTPATIENT)
Dept: LAB | Facility: MEDICAL CENTER | Age: 35
End: 2020-07-11
Payer: COMMERCIAL

## 2020-07-11 DIAGNOSIS — L68.0 HIRSUTISM: ICD-10-CM

## 2020-07-11 DIAGNOSIS — Z00.00 ANNUAL PHYSICAL EXAM: ICD-10-CM

## 2020-07-11 LAB
ALBUMIN SERPL BCP-MCNC: 4 G/DL (ref 3.5–5)
ALP SERPL-CCNC: 58 U/L (ref 46–116)
ALT SERPL W P-5'-P-CCNC: 18 U/L (ref 12–78)
ANION GAP SERPL CALCULATED.3IONS-SCNC: 2 MMOL/L (ref 4–13)
AST SERPL W P-5'-P-CCNC: 12 U/L (ref 5–45)
BASOPHILS # BLD AUTO: 0.03 THOUSANDS/ΜL (ref 0–0.1)
BASOPHILS NFR BLD AUTO: 0 % (ref 0–1)
BILIRUB SERPL-MCNC: 0.68 MG/DL (ref 0.2–1)
BUN SERPL-MCNC: 12 MG/DL (ref 5–25)
CALCIUM SERPL-MCNC: 8.9 MG/DL (ref 8.3–10.1)
CHLORIDE SERPL-SCNC: 103 MMOL/L (ref 100–108)
CHOLEST SERPL-MCNC: 161 MG/DL (ref 50–200)
CO2 SERPL-SCNC: 31 MMOL/L (ref 21–32)
CORTIS AM PEAK SERPL-MCNC: 13.6 UG/DL (ref 4.2–22.4)
CREAT SERPL-MCNC: 0.77 MG/DL (ref 0.6–1.3)
EOSINOPHIL # BLD AUTO: 0.2 THOUSAND/ΜL (ref 0–0.61)
EOSINOPHIL NFR BLD AUTO: 3 % (ref 0–6)
ERYTHROCYTE [DISTWIDTH] IN BLOOD BY AUTOMATED COUNT: 12.8 % (ref 11.6–15.1)
EST. AVERAGE GLUCOSE BLD GHB EST-MCNC: 117 MG/DL
ESTRADIOL SERPL-MCNC: 349 PG/ML
FSH SERPL-ACNC: 10.2 MIU/ML
GFR SERPL CREATININE-BSD FRML MDRD: 100 ML/MIN/1.73SQ M
GLUCOSE P FAST SERPL-MCNC: 93 MG/DL (ref 65–99)
HBA1C MFR BLD: 5.7 %
HCT VFR BLD AUTO: 40.8 % (ref 34.8–46.1)
HDLC SERPL-MCNC: 56 MG/DL
HGB BLD-MCNC: 13.2 G/DL (ref 11.5–15.4)
IMM GRANULOCYTES # BLD AUTO: 0.02 THOUSAND/UL (ref 0–0.2)
IMM GRANULOCYTES NFR BLD AUTO: 0 % (ref 0–2)
LDLC SERPL CALC-MCNC: 93 MG/DL (ref 0–100)
LH SERPL-ACNC: 75 MIU/ML
LYMPHOCYTES # BLD AUTO: 2.13 THOUSANDS/ΜL (ref 0.6–4.47)
LYMPHOCYTES NFR BLD AUTO: 29 % (ref 14–44)
MCH RBC QN AUTO: 30.4 PG (ref 26.8–34.3)
MCHC RBC AUTO-ENTMCNC: 32.4 G/DL (ref 31.4–37.4)
MCV RBC AUTO: 94 FL (ref 82–98)
MONOCYTES # BLD AUTO: 0.37 THOUSAND/ΜL (ref 0.17–1.22)
MONOCYTES NFR BLD AUTO: 5 % (ref 4–12)
NEUTROPHILS # BLD AUTO: 4.51 THOUSANDS/ΜL (ref 1.85–7.62)
NEUTS SEG NFR BLD AUTO: 63 % (ref 43–75)
NONHDLC SERPL-MCNC: 105 MG/DL
NRBC BLD AUTO-RTO: 0 /100 WBCS
PLATELET # BLD AUTO: 253 THOUSANDS/UL (ref 149–390)
PMV BLD AUTO: 10.9 FL (ref 8.9–12.7)
POTASSIUM SERPL-SCNC: 4.2 MMOL/L (ref 3.5–5.3)
PROLACTIN SERPL-MCNC: 20.5 NG/ML
PROT SERPL-MCNC: 7 G/DL (ref 6.4–8.2)
RBC # BLD AUTO: 4.34 MILLION/UL (ref 3.81–5.12)
SODIUM SERPL-SCNC: 136 MMOL/L (ref 136–145)
TRIGL SERPL-MCNC: 62 MG/DL
TSH SERPL DL<=0.05 MIU/L-ACNC: 2.29 UIU/ML (ref 0.36–3.74)
WBC # BLD AUTO: 7.26 THOUSAND/UL (ref 4.31–10.16)

## 2020-07-11 PROCEDURE — 36415 COLL VENOUS BLD VENIPUNCTURE: CPT

## 2020-07-11 PROCEDURE — 80061 LIPID PANEL: CPT

## 2020-07-11 PROCEDURE — 83001 ASSAY OF GONADOTROPIN (FSH): CPT

## 2020-07-11 PROCEDURE — 82533 TOTAL CORTISOL: CPT

## 2020-07-11 PROCEDURE — 85025 COMPLETE CBC W/AUTO DIFF WBC: CPT

## 2020-07-11 PROCEDURE — 84403 ASSAY OF TOTAL TESTOSTERONE: CPT

## 2020-07-11 PROCEDURE — 82670 ASSAY OF TOTAL ESTRADIOL: CPT

## 2020-07-11 PROCEDURE — 84146 ASSAY OF PROLACTIN: CPT

## 2020-07-11 PROCEDURE — 83036 HEMOGLOBIN GLYCOSYLATED A1C: CPT

## 2020-07-11 PROCEDURE — 84402 ASSAY OF FREE TESTOSTERONE: CPT

## 2020-07-11 PROCEDURE — 84443 ASSAY THYROID STIM HORMONE: CPT

## 2020-07-11 PROCEDURE — 83002 ASSAY OF GONADOTROPIN (LH): CPT

## 2020-07-11 PROCEDURE — 80053 COMPREHEN METABOLIC PANEL: CPT

## 2020-07-14 DIAGNOSIS — L68.0 HIRSUTISM: Primary | ICD-10-CM

## 2020-07-14 DIAGNOSIS — R79.89 ELEVATED TESTOSTERONE LEVEL IN FEMALE: ICD-10-CM

## 2020-07-14 LAB
TESTOST FREE SERPL-MCNC: 7.1 PG/ML (ref 0–4.2)
TESTOST SERPL-MCNC: 62 NG/DL (ref 8–48)

## 2020-07-17 ENCOUNTER — TELEPHONE (OUTPATIENT)
Dept: FAMILY MEDICINE CLINIC | Facility: CLINIC | Age: 35
End: 2020-07-17

## 2020-07-17 NOTE — TELEPHONE ENCOUNTER
Pt was returning our call for her lab results  I gave pt notes as documented by Dr Marcy Lu  Pt stated she received a call from [facility] & will follow up with them to schedule an appt

## 2021-04-21 ENCOUNTER — ANNUAL EXAM (OUTPATIENT)
Dept: OBGYN CLINIC | Facility: MEDICAL CENTER | Age: 36
End: 2021-04-21
Payer: COMMERCIAL

## 2021-04-21 VITALS — SYSTOLIC BLOOD PRESSURE: 130 MMHG | DIASTOLIC BLOOD PRESSURE: 72 MMHG | WEIGHT: 131 LBS | BODY MASS INDEX: 25.58 KG/M2

## 2021-04-21 DIAGNOSIS — L68.0 HIRSUTISM: ICD-10-CM

## 2021-04-21 DIAGNOSIS — Z01.419 ENCOUNTER FOR GYNECOLOGICAL EXAMINATION (GENERAL) (ROUTINE) WITHOUT ABNORMAL FINDINGS: Primary | ICD-10-CM

## 2021-04-21 DIAGNOSIS — Z12.4 ENCOUNTER FOR PAPANICOLAOU SMEAR FOR CERVICAL CANCER SCREENING: ICD-10-CM

## 2021-04-21 DIAGNOSIS — Z11.51 SCREENING FOR HPV (HUMAN PAPILLOMAVIRUS): ICD-10-CM

## 2021-04-21 PROBLEM — Z86.32 HISTORY OF GESTATIONAL DIABETES: Status: RESOLVED | Noted: 2020-02-06 | Resolved: 2021-04-21

## 2021-04-21 PROCEDURE — G0145 SCR C/V CYTO,THINLAYER,RESCR: HCPCS | Performed by: NURSE PRACTITIONER

## 2021-04-21 PROCEDURE — 87624 HPV HI-RISK TYP POOLED RSLT: CPT | Performed by: NURSE PRACTITIONER

## 2021-04-21 PROCEDURE — 99395 PREV VISIT EST AGE 18-39: CPT | Performed by: NURSE PRACTITIONER

## 2021-04-21 PROCEDURE — 1036F TOBACCO NON-USER: CPT | Performed by: NURSE PRACTITIONER

## 2021-04-21 NOTE — ASSESSMENT & PLAN NOTE
Longstanding h/o mild hirsutism  Reviewed possible causes incl but not limited to genetic predisposition, elevated testosterone  Discussed lab work up for hyperandrogenism and options for medical management   She declines work up at this time and is considering laser hair removal

## 2021-04-21 NOTE — ASSESSMENT & PLAN NOTE
Normal findings on routine gyn exam  Advised monthly SBE, annual CBE and baseline screening mammo at age 35  Reviewed ASCCP guidelines and recommended pap with cotesting q3 yrs for this low risk patient; this was collected today  STI testing was offered and the patient declines; she reports low risk  The patient reports vas for contraception  Diet/activity recommendations:  Encouraged daily Ca++ and vitamin D intake as well as daily weight bearing exercise for promotion of bone health  RTO in one year or sooner PRN

## 2021-04-21 NOTE — PROGRESS NOTES
Assessment/Plan:    Encounter for gynecological examination (general) (routine) without abnormal findings  Normal findings on routine gyn exam  Advised monthly SBE, annual CBE and baseline screening mammo at age 35  Reviewed ASCCP guidelines and recommended pap with cotesting q3 yrs for this low risk patient; this was collected today  STI testing was offered and the patient declines; she reports low risk  The patient reports vas for contraception  Diet/activity recommendations:  Encouraged daily Ca++ and vitamin D intake as well as daily weight bearing exercise for promotion of bone health  RTO in one year or sooner PRN  Hirsutism  Longstanding h/o mild hirsutism  Reviewed possible causes incl but not limited to genetic predisposition, elevated testosterone  Discussed lab work up for hyperandrogenism and options for medical management  She declines work up at this time and is considering laser hair removal         Diagnoses and all orders for this visit:    Encounter for gynecological examination (general) (routine) without abnormal findings    Encounter for Papanicolaou smear for cervical cancer screening  -     Liquid-based pap, screening    Screening for HPV (human papillomavirus)  -     Liquid-based pap, screening    Hirsutism          Subjective:      Patient ID: Elise Solorzano is a 38 y o  female  This patient presents for routine annual gyn exam    Medically stable   C/o thick hair growth on arms, a bit on lower belly and also a bit under chin  She is interested in most natural methods for management  Denies acne, h/o irreg menses  She denies acute gyn complaints  Menses are regular and light to mod  She denies pelvic pain, breast concerns, abnormal discharge, bowel/bladder dysfunction, depression/anxiety   and monogamous  She denies STI concerns  Vas for contraception       Kids are well - daughter is 11 and son is 15   They are going to [facility] for vacation this summer         The following portions of the patient's history were reviewed and updated as appropriate: allergies, current medications, past family history, past medical history, past social history, past surgical history and problem list     Review of Systems   Constitutional: Negative  Respiratory: Negative  Cardiovascular: Negative  Gastrointestinal: Negative  Genitourinary: Negative  Musculoskeletal: Negative  Skin: Negative  Hair growth over arms, chi, lower belly    Neurological: Negative  Psychiatric/Behavioral: Negative  Objective:      /72   Wt 59 4 kg (131 lb)   LMP 04/14/2021   BMI 25 58 kg/m²          Physical Exam  Constitutional:       Appearance: She is well-developed  HENT:      Head: Normocephalic and atraumatic  Eyes:      Pupils: Pupils are equal, round, and reactive to light  Neck:      Musculoskeletal: Normal range of motion and neck supple  Thyroid: No thyromegaly  Cardiovascular:      Rate and Rhythm: Normal rate and regular rhythm  Heart sounds: Normal heart sounds  Pulmonary:      Effort: Pulmonary effort is normal  No respiratory distress  Breath sounds: Normal breath sounds  No wheezing or rales  Chest:      Chest wall: No mass, deformity or tenderness  Breasts: Breasts are symmetrical          Right: No inverted nipple, mass, nipple discharge, skin change or tenderness  Left: No inverted nipple, mass, nipple discharge, skin change or tenderness  Abdominal:      General: There is no distension  Palpations: Abdomen is soft  There is no hepatomegaly, splenomegaly or mass  Tenderness: There is no abdominal tenderness  There is no guarding or rebound  Genitourinary:     Labia:         Right: No rash, tenderness, lesion or injury  Left: No rash, tenderness, lesion or injury  Vagina: Normal  No foreign body  No vaginal discharge, erythema, tenderness or bleeding        Cervix: No cervical motion tenderness, discharge or friability  Uterus: Normal        Adnexa:         Right: No mass, tenderness or fullness  Left: No mass, tenderness or fullness  Rectum: Normal    Musculoskeletal: Normal range of motion  Lymphadenopathy:      Cervical: No cervical adenopathy  Skin:     General: Skin is warm and dry  Findings: No rash  Nails: There is no clubbing  Neurological:      Mental Status: She is alert and oriented to person, place, and time  Cranial Nerves: No cranial nerve deficit  Psychiatric:         Speech: Speech normal          Behavior: Behavior normal          Thought Content:  Thought content normal          Judgment: Judgment normal

## 2021-04-22 LAB
HPV HR 12 DNA CVX QL NAA+PROBE: NEGATIVE
HPV16 DNA CVX QL NAA+PROBE: NEGATIVE
HPV18 DNA CVX QL NAA+PROBE: NEGATIVE

## 2021-04-27 LAB
LAB AP GYN PRIMARY INTERPRETATION: NORMAL
Lab: NORMAL

## 2022-04-08 ENCOUNTER — OFFICE VISIT (OUTPATIENT)
Dept: FAMILY MEDICINE CLINIC | Facility: CLINIC | Age: 37
End: 2022-04-08
Payer: COMMERCIAL

## 2022-04-08 VITALS
DIASTOLIC BLOOD PRESSURE: 72 MMHG | BODY MASS INDEX: 25.32 KG/M2 | RESPIRATION RATE: 16 BRPM | HEART RATE: 79 BPM | WEIGHT: 129 LBS | OXYGEN SATURATION: 98 % | HEIGHT: 60 IN | SYSTOLIC BLOOD PRESSURE: 126 MMHG | TEMPERATURE: 98.2 F

## 2022-04-08 DIAGNOSIS — K59.09 OTHER CONSTIPATION: ICD-10-CM

## 2022-04-08 DIAGNOSIS — K21.9 GASTROESOPHAGEAL REFLUX DISEASE WITHOUT ESOPHAGITIS: Primary | ICD-10-CM

## 2022-04-08 DIAGNOSIS — R35.0 INCREASED FREQUENCY OF URINATION: ICD-10-CM

## 2022-04-08 PROCEDURE — 3725F SCREEN DEPRESSION PERFORMED: CPT | Performed by: FAMILY MEDICINE

## 2022-04-08 PROCEDURE — 1036F TOBACCO NON-USER: CPT | Performed by: FAMILY MEDICINE

## 2022-04-08 PROCEDURE — 99214 OFFICE O/P EST MOD 30 MIN: CPT | Performed by: FAMILY MEDICINE

## 2022-04-08 PROCEDURE — 3008F BODY MASS INDEX DOCD: CPT | Performed by: FAMILY MEDICINE

## 2022-04-08 RX ORDER — FAMOTIDINE 20 MG/1
20 TABLET, FILM COATED ORAL 2 TIMES DAILY
Qty: 30 TABLET | Refills: 1 | Status: SHIPPED | OUTPATIENT
Start: 2022-04-08 | End: 2022-06-22 | Stop reason: ALTCHOICE

## 2022-04-08 RX ORDER — FAMOTIDINE 20 MG/1
20 TABLET, FILM COATED ORAL 2 TIMES DAILY
Qty: 30 TABLET | Refills: 1 | Status: SHIPPED | OUTPATIENT
Start: 2022-04-08 | End: 2022-04-08 | Stop reason: SDUPTHER

## 2022-04-08 NOTE — ASSESSMENT & PLAN NOTE
Patient's history of gestational diabetes reported increased frequency urination  Patient's last hemoglobin A1c was 5 7  Will repeat at this time together with CMP and lipid panel for evaluation of diabetes

## 2022-04-08 NOTE — ASSESSMENT & PLAN NOTE
Patient does have nausea, indigestion especially at night likely secondary to acid reflux  In regards to her diet, food such as garlic, onions, leak or tomatoes and other acidic food may increase the risk of acid reflux  Do not lie down or to crying immediately after you eat  Take time with food, to not over the stump itself  Try to aim for about 80% full  If possible, to change the largest meal of the day from dinner to lunch  Of void eating especially snacking late at night    Will start patient on famotidine 20 mg daily for the next months    If he noticed significantly worsening pain, nausea and vomiting or no bowel movement in greater than 3 days please return for evaluation

## 2022-04-08 NOTE — PROGRESS NOTES
Assessment/Plan:    Gastroesophageal reflux disease without esophagitis  Patient does have nausea, indigestion especially at night likely secondary to acid reflux  In regards to her diet, food such as garlic, onions, leak or tomatoes and other acidic food may increase the risk of acid reflux  Do not lie down or to crying immediately after you eat  Take time with food, to not over the stump itself  Try to aim for about 80% full  If possible, to change the largest meal of the day from dinner to lunch  Of void eating especially snacking late at night    Will start patient on famotidine 20 mg daily for the next months    If he noticed significantly worsening pain, nausea and vomiting or no bowel movement in greater than 3 days please return for evaluation    Other constipation  Patient disclosed best from every day however has occasional hard stools that is painful to push out  This is likely due to dehydration rather than to a motility issue  Includes patient maintained adequate hydration with at least 64 oz of water a day  Will rule out other causes with TSH    Increased frequency of urination  Patient's history of gestational diabetes reported increased frequency urination  Patient's last hemoglobin A1c was 5 7  Will repeat at this time together with CMP and lipid panel for evaluation of diabetes      BMI Counseling: Body mass index is 25 19 kg/m²  The BMI is above normal  Nutrition recommendations include reducing portion sizes, 3-5 servings of fruits/vegetables daily and consuming healthier snacks  Exercise recommendations include moderate aerobic physical activity for 150 minutes/week  Subjective:      Patient ID: Chhaya De La Rosa is a 39 y o  female  HPI    44-year-old female patient presents with concern regarding abdominal bloating and discomfort  Patient has similar symptoms for a few months now but has recently noticed increased worsening symptoms    Patient reports she is having some difficulty sleeping at night due to acid reflux symptoms including nausea, acidic taste associated indigestion  She also has some pain in her left lower quadrant, cardiac rates about 3 to 4/10  Patient is having bowel movement once a day but states the bowel movement can occasionally be hard and difficult to push out  Review of Systems   Constitutional: Negative for chills and fever  HENT: Negative for congestion, rhinorrhea and sore throat  Respiratory: Negative for shortness of breath  Cardiovascular: Negative for chest pain  Gastrointestinal: Positive for abdominal distention and abdominal pain  Negative for constipation, diarrhea, nausea and vomiting  Genitourinary: Negative for dysuria and hematuria  Neurological: Negative for headaches  Psychiatric/Behavioral: Negative for sleep disturbance  Objective:    /72 (BP Location: Left arm, Patient Position: Sitting, Cuff Size: Standard)   Pulse 79   Temp 98 2 °F (36 8 °C) (Temporal)   Resp 16   Ht 5' (1 524 m)   Wt 58 5 kg (129 lb)   SpO2 98%   BMI 25 19 kg/m²       Physical Exam  Vitals reviewed  Constitutional:       General: She is not in acute distress  Appearance: She is well-developed  She is not ill-appearing or toxic-appearing  Cardiovascular:      Rate and Rhythm: Normal rate and regular rhythm  Pulses: Normal pulses  Heart sounds: Normal heart sounds  No murmur heard  Pulmonary:      Effort: Pulmonary effort is normal  No respiratory distress  Breath sounds: Normal breath sounds  Abdominal:      General: Abdomen is flat  Bowel sounds are normal  There is no distension  Palpations: Abdomen is soft  There is no shifting dullness  Musculoskeletal:         General: No swelling, tenderness, deformity or signs of injury  Normal range of motion  Skin:     General: Skin is warm and dry  Capillary Refill: Capillary refill takes less than 2 seconds  Coloration: Skin is not jaundiced  Neurological:      General: No focal deficit present  Mental Status: She is alert and oriented to person, place, and time  Psychiatric:         Mood and Affect: Mood normal           JUAN C Henriquez  [facility]    Please excuse any "sound-alike" errors that may have ocurred during the process of dictation  Parts of this note have been dictated and there may be errors present in the transcription process  Thank you

## 2022-04-08 NOTE — ASSESSMENT & PLAN NOTE
Patient disclosed best from every day however has occasional hard stools that is painful to push out  This is likely due to dehydration rather than to a motility issue  Includes patient maintained adequate hydration with at least 64 oz of water a day  Will rule out other causes with TSH

## 2022-05-19 ENCOUNTER — OFFICE VISIT (OUTPATIENT)
Dept: FAMILY MEDICINE CLINIC | Facility: CLINIC | Age: 37
End: 2022-05-19
Payer: COMMERCIAL

## 2022-05-19 VITALS
HEART RATE: 74 BPM | BODY MASS INDEX: 25.56 KG/M2 | HEIGHT: 60 IN | SYSTOLIC BLOOD PRESSURE: 114 MMHG | DIASTOLIC BLOOD PRESSURE: 78 MMHG | TEMPERATURE: 98 F | OXYGEN SATURATION: 98 % | WEIGHT: 130.2 LBS

## 2022-05-19 DIAGNOSIS — R35.0 INCREASED FREQUENCY OF URINATION: ICD-10-CM

## 2022-05-19 DIAGNOSIS — Z00.00 ANNUAL PHYSICAL EXAM: Primary | ICD-10-CM

## 2022-05-19 LAB
SL AMB  POCT GLUCOSE, UA: NORMAL
SL AMB LEUKOCYTE ESTERASE,UA: NORMAL
SL AMB POCT BILIRUBIN,UA: NORMAL
SL AMB POCT BLOOD,UA: NORMAL
SL AMB POCT COLOR,UA: YELLOW
SL AMB POCT KETONES,UA: NORMAL
SL AMB POCT NITRITE,UA: NORMAL
SL AMB POCT PH,UA: 7
SL AMB POCT SPECIFIC GRAVITY,UA: 1
SL AMB POCT URINE PROTEIN: NORMAL
SL AMB POCT UROBILINOGEN: NORMAL

## 2022-05-19 PROCEDURE — 81002 URINALYSIS NONAUTO W/O SCOPE: CPT | Performed by: FAMILY MEDICINE

## 2022-05-19 PROCEDURE — 87086 URINE CULTURE/COLONY COUNT: CPT | Performed by: FAMILY MEDICINE

## 2022-05-19 PROCEDURE — 1036F TOBACCO NON-USER: CPT | Performed by: FAMILY MEDICINE

## 2022-05-19 PROCEDURE — 3008F BODY MASS INDEX DOCD: CPT | Performed by: FAMILY MEDICINE

## 2022-05-19 PROCEDURE — 99395 PREV VISIT EST AGE 18-39: CPT | Performed by: FAMILY MEDICINE

## 2022-05-19 NOTE — PROGRESS NOTES
[facility]    NAME: Nabil Stephen  AGE: 39 y o  SEX: female  : 1985     DATE: 2022     Assessment and Plan:     Problem List Items Addressed This Visit    None     Visit Diagnoses     Annual physical exam    -  Primary    lab orders in, will call with results          Immunizations and preventive care screenings were discussed with patient today  Appropriate education was printed on patient's after visit summary  Counseling:  Dental Health: discussed importance of regular tooth brushing, flossing, and dental visits  · Exercise: the importance of regular exercise/physical activity was discussed  Recommend exercise 3-5 times per week for at least 30 minutes  No follow-ups on file  Chief Complaint:     Chief Complaint   Patient presents with    Annual Exam      History of Present Illness:     Adult Annual Physical   Patient here for a comprehensive physical exam  The patient reports no problems  Diet and Physical Activity  · Diet/Nutrition: well balanced diet and consuming 3-5 servings of fruits/vegetables daily  · Exercise: walking, strength training exercises, 1-2 times a week on average and 30-60 minutes on average  Depression Screening  PHQ-2/9 Depression Screening         General Health  · Sleep: sleeps well and gets 7-8 hours of sleep on average  · Hearing: normal - bilateral   · Vision: goes for regular eye exams, most recent eye exam <1 year ago and wears contacts  · Dental: regular dental visits  /GYN Health  · Last menstrual period: 22  · Contraceptive method: barrier methods   vasectomy  · History of STDs?: no   · Denies DV, adeq calcium     Review of Systems:     Review of Systems   Constitutional: Negative for fatigue and fever  HENT: Negative for congestion  Eyes: Negative for visual disturbance     Respiratory: Negative for chest tightness and shortness of breath  Cardiovascular: Negative for chest pain and palpitations  Gastrointestinal: Negative for abdominal pain, constipation and diarrhea  Genitourinary: Negative for difficulty urinating and menstrual problem  Musculoskeletal: Negative for arthralgias  Skin: Negative for rash  Neurological: Negative for headaches  Hematological: Does not bruise/bleed easily  Psychiatric/Behavioral: Negative for dysphoric mood  The patient is not nervous/anxious         Past Medical History:     Past Medical History:   Diagnosis Date    Gestational diabetes     Type A blood, Rh negative       Past Surgical History:     Past Surgical History:   Procedure Laterality Date    APPENDECTOMY  2008      Social History:     Social History     Socioeconomic History    Marital status: /Civil Union     Spouse name: None    Number of children: None    Years of education: None    Highest education level: None   Occupational History    None   Tobacco Use    Smoking status: Never Smoker    Smokeless tobacco: Never Used   Vaping Use    Vaping Use: Never used   Substance and Sexual Activity    Alcohol use: Yes     Comment: occ    Drug use: No    Sexual activity: Yes     Partners: Male     Birth control/protection: Male Sterilization     Comment:    Other Topics Concern    None   Social History Narrative    Always uses a seatbelt    Coffee    Per [employer]: Single     Social Determinants of Health     Financial Resource Strain: Not on file   Food Insecurity: Not on file   Transportation Needs: Not on file   Physical Activity: Not on file   Stress: Not on file   Social Connections: Not on file   Intimate Partner Violence: Not on file   Housing Stability: Not on file      Family History:     Family History   Problem Relation Age of Onset    No Known Problems Mother     Hypertension Father     No Known Problems Sister     No Known Problems Brother     Diabetes Maternal Grandmother     Hypertension Paternal Grandmother     Hypertension Paternal Grandfather     Diabetes Maternal Aunt     Diabetes Maternal Uncle     Breast cancer Paternal Aunt       Current Medications:     Current Outpatient Medications   Medication Sig Dispense Refill    famotidine (PEPCID) 20 mg tablet Take 1 tablet (20 mg total) by mouth 2 (two) times a day 30 tablet 1    MULTIPLE VITAMINS PO Take by mouth      Multiple Vitamins-Minerals (MULTIVITAMIN ADULT PO) Take by mouth       No current facility-administered medications for this visit  Allergies: Allergies   Allergen Reactions    Codeine Vomiting     Reaction Date: [id number];       Physical Exam:     /78 (BP Location: Right arm, Patient Position: Sitting, Cuff Size: Standard)   Pulse 74   Temp 98 °F (36 7 °C)   Ht 5' (1 524 m)   Wt 59 1 kg (130 lb 3 2 oz)   LMP 04/27/2022   SpO2 98%   BMI 25 43 kg/m²     Physical Exam  Vitals reviewed  Constitutional:       Appearance: Normal appearance  She is well-developed and normal weight  HENT:      Right Ear: Tympanic membrane, ear canal and external ear normal       Left Ear: Tympanic membrane, ear canal and external ear normal       Mouth/Throat:      Mouth: Mucous membranes are moist       Pharynx: Oropharynx is clear  Eyes:      Conjunctiva/sclera: Conjunctivae normal       Pupils: Pupils are equal, round, and reactive to light  Cardiovascular:      Rate and Rhythm: Normal rate and regular rhythm  Heart sounds: Normal heart sounds  Pulmonary:      Effort: Pulmonary effort is normal       Breath sounds: Normal breath sounds  Abdominal:      General: Bowel sounds are normal       Palpations: Abdomen is soft  Tenderness: There is no abdominal tenderness  Hernia: No hernia is present  Musculoskeletal:         General: Normal range of motion  Cervical back: Normal range of motion and neck supple  Right lower leg: No edema  Left lower leg: No edema     Lymphadenopathy: Cervical: No cervical adenopathy  Skin:     General: Skin is warm  Capillary Refill: Capillary refill takes less than 2 seconds  Neurological:      General: No focal deficit present  Mental Status: She is alert and oriented to person, place, and time  Psychiatric:         Mood and Affect: Mood normal          Behavior: Behavior normal          Thought Content:  Thought content normal          Judgment: Judgment normal           Gege Caldwell MD   [facility]

## 2022-05-19 NOTE — PATIENT INSTRUCTIONS

## 2022-05-22 LAB — BACTERIA UR CULT: NORMAL

## 2022-06-14 ENCOUNTER — OFFICE VISIT (OUTPATIENT)
Dept: OBGYN CLINIC | Facility: MEDICAL CENTER | Age: 37
End: 2022-06-14
Payer: COMMERCIAL

## 2022-06-14 VITALS
SYSTOLIC BLOOD PRESSURE: 118 MMHG | BODY MASS INDEX: 25.17 KG/M2 | WEIGHT: 128.2 LBS | DIASTOLIC BLOOD PRESSURE: 80 MMHG | HEIGHT: 60 IN

## 2022-06-14 DIAGNOSIS — N64.4 MASTALGIA: ICD-10-CM

## 2022-06-14 DIAGNOSIS — Z80.3 FAMILY HISTORY OF BREAST CANCER: ICD-10-CM

## 2022-06-14 DIAGNOSIS — N63.10 BILATERAL BREAST LUMP: Primary | ICD-10-CM

## 2022-06-14 DIAGNOSIS — N63.20 BILATERAL BREAST LUMP: Primary | ICD-10-CM

## 2022-06-14 PROCEDURE — 99213 OFFICE O/P EST LOW 20 MIN: CPT | Performed by: CLINICAL NURSE SPECIALIST

## 2022-06-14 NOTE — ASSESSMENT & PLAN NOTE
Exam largely normal  + fibroglandular texture - likely what is being felt by pt but need imaging   Ordered

## 2022-06-14 NOTE — PROGRESS NOTES
Assessment/Plan:       1  Bilateral breast lump  Assessment & Plan:  Exam largely normal  + fibroglandular texture - likely what is being felt by pt but need imaging  Ordered    Orders:  -     US breast bilateral limited (diagnostic); Future; Expected date: 06/15/2022  -     Mammo diagnostic bilateral w 3d & cad; Future; Expected date: 06/14/2022    2  Mastalgia    3  Family history of breast cancer          Subjective:      Patient ID: Jennifer Sher is a 39 y o  female  She is here for Breast Mass (Lumps on both breasts, causes some discomfort  Mother just diagnosed with triple neg breast cancer  Pat aunt with breast cancer  Waiting on mother's BRCA test results )    HPI  PT concerned for b/l breast lumps  First noted about 1-2 months ago  L>R  Mildly tender  Denies nipple discharge  Pts mother recently dx with Breast ca (triple neg) genetic testing pending  Menstrual History:  Patient's last menstrual period was 05/31/2022 (approximate)  The following portions of the patient's history were reviewed and updated as appropriate: allergies, current medications, past family history, past medical history, past social history, past surgical history, and problem list     Review of Systems  See HPI for pertinent positives          Objective:    /80 (BP Location: Left arm, Patient Position: Sitting, Cuff Size: Adult)   Ht 4' 11 5" (1 511 m)   Wt 58 2 kg (128 lb 3 2 oz)   LMP 05/31/2022 (Approximate)   BMI 25 46 kg/m²      Physical Exam  Constitutional:       General: She is not in acute distress  Appearance: Normal appearance  Genitourinary:      Genitourinary Comments: Pelvic exam deferred     Breasts:      Right: No nipple discharge, skin change or tenderness  Left: No nipple discharge, skin change or tenderness  Cardiovascular:      Rate and Rhythm: Normal rate  Pulmonary:      Effort: Pulmonary effort is normal    Chest:       Abdominal:      General: There is no distension  Palpations: Abdomen is soft  Musculoskeletal:         General: Normal range of motion  Neurological:      Mental Status: She is alert and oriented to person, place, and time  Skin:     General: Skin is warm and dry     Psychiatric:         Mood and Affect: Mood normal          Behavior: Behavior normal

## 2022-06-21 ENCOUNTER — HOSPITAL ENCOUNTER (OUTPATIENT)
Dept: MAMMOGRAPHY | Facility: CLINIC | Age: 37
Discharge: HOME | End: 2022-06-21
Payer: COMMERCIAL

## 2022-06-21 ENCOUNTER — HOSPITAL ENCOUNTER (OUTPATIENT)
Dept: ULTRASOUND IMAGING | Facility: CLINIC | Age: 37
Discharge: HOME | End: 2022-06-21
Payer: COMMERCIAL

## 2022-06-21 VITALS — HEIGHT: 60 IN | BODY MASS INDEX: 25.13 KG/M2 | WEIGHT: 128 LBS

## 2022-06-21 DIAGNOSIS — N63.10 BILATERAL BREAST LUMP: ICD-10-CM

## 2022-06-21 DIAGNOSIS — N63.20 BILATERAL BREAST LUMP: ICD-10-CM

## 2022-06-21 PROCEDURE — 76642 ULTRASOUND BREAST LIMITED: CPT

## 2022-06-21 PROCEDURE — G0279 TOMOSYNTHESIS, MAMMO: HCPCS

## 2022-06-21 PROCEDURE — 77066 DX MAMMO INCL CAD BI: CPT

## 2022-06-22 NOTE — PROGRESS NOTES
Diagnoses and all orders for this visit:    Dense breast tissue on mammogram  -     US breast screening bilateral complete (ABUS); Future  -     Ambulatory Referral to Oncology Genetics; Future  -     Ambulatory Referral to Breast Surgery; Future    Encounter for gynecological examination without abnormal finding    Pelvic pain  -     US pelvis complete w transvaginal; Future    Encounter for screening mammogram for malignant neoplasm of breast  -     Mammo screening bilateral w 3d & cad; Future    Family hx-breast malignancy  -     Ambulatory Referral to Oncology Genetics; Future  -     Ambulatory Referral to Breast Surgery; Future        Health Maintenance:    Last PAP: 2021 Neg/Neg   Next PAP Due:     Last Mammogram: 2022  Bi-Rads 1 Neg ,  Routine screening yearly, TISSUE DENSITY:   The breasts are heterogeneously dense, which may obscure small masses  Lifetime Zanderer-Anaya: 17 92 %  ABUS ordered    Next Mammogram:     Last Colonoscopy: Not on file    advised age 38    Gardisil:  Not completed Gardasil 9 was advised for prevention of HPV-related disease  We discussed risks/benefits, SE's/AE's at length and all questions were answered  Written info was provided for review  The patient agrees to consider and is aware she may call to schedule injection #1 at any time  Subjective    CC: Yearly Exam      Cadence Matute is a 39 y o  female here for an annual exam  Aggie Dawson  GYN hx includes: GDM, 1  , 1 vaginal delivery,  No personal Hx of breast, cervical, ovarian or colon CA  Family hx of: Mother with Triple Neg BC, recent diagnosis , PA with [facility]  Medically stable, reports no changes in medical Hx, follows with PMD  Pt would like an MRI for her breast density, we discussed ABUS is recommended at this time based on her risk     Breast surgeon referral placed as requested by patient   Genetic oncology referral placed as well     Patient's last menstrual period was 2022 (exact date)   Her menstrual cycles are regular every 28-30 days  She denies issues with bleeding or her menses  reports occasional left lower pelvic twinges come and go, are not associated with anything that she can think of patient would like ultrasound  Order provided  Denies history of abnormal pap smear  She denies breast concerns, abnormal vaginal discharge, vaginal itching, odor, irritation, bowel/bladder dysfunction, urinary symptoms, pelvic pain, or dyspareunia today  She is sexually active  Monogamous relationship  Her current method of contraception includes none  Denies any issues with her BCM   with Vasectomy   She does not want STD testing today    Denies intimate partner violence    Kids are well  Her mother is now living with her due to the fact that she was just diagnosed with triple negative breast cancer and will be receiving treatment at [facility]    Past Medical History:   Diagnosis Date    Gestational diabetes     Type A blood, Rh negative      Past Surgical History:   Procedure Laterality Date    APPENDECTOMY  2008       Immunization History   Administered Date(s) Administered    Influenza Quadrivalent Preservative Free 3 years and older IM 02/27/2015    Tdap 10/04/2010, 05/26/2015       Family History   Problem Relation Age of Onset    Breast cancer Mother 59    Hypertension Father     No Known Problems Sister     No Known Problems Daughter     Diabetes Maternal Grandmother     Hypertension Paternal Grandmother     Hypertension Paternal Grandfather     Diabetes Maternal Aunt     Diabetes Maternal Uncle     Breast cancer Paternal Aunt      Social History     Tobacco Use    Smoking status: Never Smoker    Smokeless tobacco: Never Used   Vaping Use    Vaping Use: Never used   Substance Use Topics    Alcohol use: Yes     Comment: occ    Drug use: No       Current Outpatient Medications:     MULTIPLE VITAMINS PO, Take by mouth, Disp: , Rfl:     Multiple Vitamins-Minerals (MULTIVITAMIN ADULT PO), Take by mouth (Patient not taking: Reported on 2022), Disp: , Rfl:   Patient Active Problem List    Diagnosis Date Noted    Bilateral breast lump 2022    Gastroesophageal reflux disease without esophagitis 2022    Other constipation 2022    Increased frequency of urination 2022    Hirsutism 2021    Encounter for gynecological examination (general) (routine) without abnormal findings 2020       Allergies   Allergen Reactions    Codeine Vomiting     Reaction Date: 2012;        OB History    Para Term  AB Living   2 2 2     2   SAB IAB Ectopic Multiple Live Births           2      # Outcome Date GA Lbr Floyd/2nd Weight Sex Delivery Anes PTL Lv   2 Term      CS-LTranv   DIONI   1 Term      Vag-Spont   DIONI       Vitals:    22 0832   BP: 114/62   BP Location: Left arm   Patient Position: Sitting   Cuff Size: Standard   Weight: 58 3 kg (128 lb 9 6 oz)   Height: 5' (1 524 m)     Body mass index is 25 12 kg/m²  Review of Systems     Constitutional: Negative for chills, fatigue, fever, headaches, visual disturbances, and unexpected weight change  Respiratory: Negative for cough, & shortness of breath  Cardiovascular: Negative for chest pain       Gastrointestinal: Negative for Abd pain, nausea & vomiting, constipation and diarrhea  Genitourinary: Negative for difficulty urinating, dysuria, hematuria, dyspareunia, unusual vaginal bleeding or discharge  Skin: Negative skin changes, thick hair growth arms, abd,, & chin    Physical Exam     Constitutional: Alert & Oriented x3, well-developed and well-nourished  No distress  HENT: Atraumatic, Normocephalic, Conjunctivae clear  Neck: Normal range of motion  Neck supple  No thyromegaly, mass, nodules or tenderness  Pulmonary: Effort normal  Lungs clear to ascultation bilateral  Cardiac: RRR, no murmur   Abdominal: Soft   No tenderness or masses  Musculoskeletal: Normal ROM  Skin: Warm & Dry  Psychological: Normal mood, thought content, behavior & judgement     Breasts:   Right: tissue soft without masses, tenderness, skin changes or nipple discharge  No areas of erythema or pain  No subclavicular, axillary, pectoral adenopathy  Left:  tissue soft without masses, tenderness, skin changes or nipple discharge  No areas of erythema or pain  No subclavicular, axillary, pectoral adenopathy    Pelvic exam was performed with patient supine, lithotomy position  Labia: Negative rash, tenderness, lesion or injury on the right labia  Negative rash, tenderness, lesion or injury on the left labia  Urethral meatus:  Negative for  tenderness, inflammation or discharge  Uterus: not deviated, enlarged, fixed or tender  Cervix: No CMT, no discharge or friability  Right adnexa: no mass, no tenderness and no fullness  Left adnexa: no mass, no tenderness and no fullness  Vagina: No erythema, tenderness, masses, or foreign body in the vagina  No signs of injury around the vagina  No unusual vaginal discharge   Perineum without lesions, signs of injury, erythema or swelling  Inguinal Canal:        Right: No inguinal adenopathy or hernia present  Left: No inguinal adenopathy or hernia present  Perineal hygiene reviewed   Weight bearing exercises minium of 150 mins/weekly advised  Kegel exercises recommended  SBE encouraged, ASCCP guidelines reviewed  Condoms encouraged with all sexual activity to prevent STI's  Gardisil vaccines recommended up to age 39  Calcium/ Vit D dietary requirements discussed,   Advised to call with any issues,  all concerns & questions addressed     See provided information in your after visit summary   Follow-up with all referrals    F/U Annually and PRN      Your mammogram is normal, although your breast tissue is extremely dense which may hide small masses/limit the sensitivity of this mammogram    Based on your breast density, I would recommend follow up with an automated breast ultrasound (ABUS)  You should verify coverage of this test with your insurance company to be familiar with any out of pocket expenses  This could also be considered to be automatically done with your mammogram next year  Discuss with your provider at your next annual exam   I placed the order for your ABUS test in your chart  You can call central scheduling to choose an appt time convenient for you ([phone])  Continue with monthly breast self exam and annual mammograms

## 2022-06-22 NOTE — PATIENT INSTRUCTIONS
Breast Self Exam for Women   AMBULATORY CARE:   A breast self-exam (BSE)  is a way to check your breasts for lumps and other changes  Regular BSEs can help you know how your breasts normally look and feel  Most breast lumps or changes are not cancer, but you should always have them checked by a healthcare provider  Why you should do a BSE:  Breast cancer is the most common type of cancer in women  Even if you have mammograms, you may still want to do a BSE regularly  If you know how your breasts normally feel and look, it may help you know when to contact your healthcare provider  Mammograms can miss some cancers  You may find a lump during a BSE that did not show up on a mammogram   When you should do a BSE:  If you have periods, you may want to do your BSE 1 week after your period ends  This is the time when your breasts may be the least swollen, lumpy, or tender  You can do regular BSEs even if you are breastfeeding or have breast implants  Call your doctor if:   You find any lumps or changes in your breasts  You have breast pain or fluid coming from your nipples  You have questions or concerns about your condition or care  How to do a BSE:       Look at your breasts in a mirror  Look at the size and shape of each breast and nipple  Check for swelling, lumps, dimpling, scaly skin, or other skin changes  Look for nipple changes, such as a nipple that is painful or beginning to pull inward  Gently squeeze both nipples and check to see if fluid (that is not breast milk) comes out of them  If you find any of these or other breast changes, contact your healthcare provider  Check your breasts while you sit or  the following 3 positions:    Magy Mata your arms down at your sides  Raise your hands and join them behind your head  Put firm pressure with your hands on your hips  Bend slightly forward while you look at your breasts in the mirror  Lie down and feel your breasts    When you lie down, your breast tissue spreads out evenly over your chest  This makes it easier for you to feel for lumps and anything that may not be normal for your breasts  Do a BSE on one breast at a time  Place a small pillow or towel under your left shoulder  Put your left arm behind your head  Use the 3 middle fingers of your right hand  Use your fingertip pads, on the top of your fingers  Your fingertip pad is the most sensitive part of your finger  Use small circles to feel your breast tissue  Use your fingertip pads to make dime-sized, overlapping circles on your breast and armpits  Use light, medium, and firm pressure  First, press lightly  Second, press with medium pressure to feel a little deeper into the breast  Last, use firm pressure to feel deep within your breast     Examine your entire breast area  Examine the breast area from above the breast to below the breast where you feel only ribs  Make small circles with your fingertips, starting in the middle of your armpit  Make circles going up and down the breast area  Continue toward your breast and all the way across it  Examine the area from your armpit all the way over to the middle of your chest (breastbone)  Stop at the middle of your chest     Move the pillow or towel to your right shoulder, and put your right arm behind your head  Use the 3 fingertip pads of your left hand, and repeat the above steps to do a BSE on your right breast     What else you can do to check for breast problems or cancer:  Talk to your healthcare provider about mammograms  A mammogram is an x-ray of your breasts to screen for breast cancer or other problems  Your provider can tell you the benefits and risks of mammograms  The first mammogram is usually at age 39 or 48  Your provider may recommend you start at 36 or younger if your risk for breast cancer is high  Mammograms usually continue every 1 to 2 years until age 76         Follow up with your doctor as directed:  Write down your questions so you remember to ask them during your visits  © Copyright [employer] 2022 Information is for End User's use only and may not be sold, redistributed or otherwise used for commercial purposes  All illustrations and images included in CareNotes® are the copyrighted property of A D A M , Inc  or Dante Varela  The above information is an  only  It is not intended as medical advice for individual conditions or treatments  Talk to your doctor, nurse or pharmacist before following any medical regimen to see if it is safe and effective for you  Wellness Visit for Adults   AMBULATORY CARE:   A wellness visit  is when you see your healthcare provider to get screened for health problems  Your healthcare provider will also give you advice on how to stay healthy  Write down your questions so you remember to ask them  Ask your healthcare provider how often you should have a wellness visit  What happens at a wellness visit:  Your healthcare provider will ask about your health, and your family history of health problems  This includes high blood pressure, heart disease, and cancer  He or she will ask if you have symptoms that concern you, if you smoke, and about your mood  You may also be asked about your intake of medicines, supplements, food, and alcohol  Any of the following may be done: Your weight  will be checked  Your height may also be checked so your body mass index (BMI) can be calculated  Your BMI shows if you are at a healthy weight  Your blood pressure  and heart rate will be checked  Your temperature may also be checked  Blood and urine tests  may be done  Blood tests may be done to check your cholesterol levels  Abnormal cholesterol levels increase your risk for heart disease and stroke  You may also need a blood or urine test to check for diabetes if you are at increased risk  Urine tests may be done to look for signs of an infection or kidney disease      A physical exam  includes checking your heartbeat and lungs with a stethoscope  Your healthcare provider may also check your skin to look for sun damage  Screening tests  may be recommended  A screening test is done to check for diseases that may not cause symptoms  The screening tests you may need depend on your age, gender, family history, and lifestyle habits  For example, colorectal screening may be recommended if you are 48years old or older  Screening tests you need if you are a woman:   A Pap smear  is used to screen for cervical cancer  Pap smears are usually done every 3 to 5 years depending on your age  You may need them more often if you have had abnormal Pap smear test results in the past  Ask your healthcare provider how often you should have a Pap smear  A mammogram  is an x-ray of your breasts to screen for breast cancer  Experts recommend mammograms every 2 years starting at age 48 years  You may need a mammogram at age 52 years or younger if you have an increased risk for breast cancer  Talk to your healthcare provider about when you should start having mammograms and how often you need them  Vaccines you may need:   Get an influenza vaccine  every year  The influenza vaccine protects you from the flu  Several types of viruses cause the flu  The viruses change over time, so new vaccines are made each year  Get a tetanus-diphtheria (Td) booster vaccine  every 10 years  This vaccine protects you against tetanus and diphtheria  Tetanus is a severe infection that may cause painful muscle spasms and lockjaw  Diphtheria is a severe bacterial infection that causes a thick covering in the back of your mouth and throat  Get a human papillomavirus (HPV) vaccine  if you are female and aged 23 to 32 or male 23 to 24 and never received it  This vaccine protects you from HPV infection  HPV is the most common infection spread by sexual contact   HPV may also cause vaginal, penile, and anal cancers  Get a pneumococcal vaccine  if you are aged 72 years or older  The pneumococcal vaccine is an injection given to protect you from pneumococcal disease  Pneumococcal disease is an infection caused by pneumococcal bacteria  The infection may cause pneumonia, meningitis, or an ear infection  Get a shingles vaccine  if you are 60 or older, even if you have had shingles before  The shingles vaccine is an injection to protect you from the varicella-zoster virus  This is the same virus that causes chickenpox  Shingles is a painful rash that develops in people who had chickenpox or have been exposed to the virus  How to eat healthy:  My Plate is a model for planning healthy meals  It shows the types and amounts of foods that should go on your plate  Fruits and vegetables make up about half of your plate, and grains and protein make up the other half  A serving of dairy is included on the side of your plate  The amount of calories and serving sizes you need depends on your age, gender, weight, and height  Examples of healthy foods are listed below:  Eat a variety of vegetables  such as dark green, red, and orange vegetables  You can also include canned vegetables low in sodium (salt) and frozen vegetables without added butter or sauces  Eat a variety of fresh fruits , canned fruit in 100% juice, frozen fruit, and dried fruit  Include whole grains  At least half of the grains you eat should be whole grains  Examples include whole-wheat bread, wheat pasta, brown rice, and whole-grain cereals such as oatmeal     Eat a variety of protein foods such as seafood (fish and shellfish), lean meat, and poultry without skin (turkey and chicken)  Examples of lean meats include pork leg, shoulder, or tenderloin, and beef round, sirloin, tenderloin, and extra lean ground beef  Other protein foods include eggs and egg substitutes, beans, peas, soy products, nuts, and seeds      Choose low-fat dairy products such as skim or 1% milk or low-fat yogurt, cheese, and cottage cheese  Limit unhealthy fats  such as butter, hard margarine, and shortening  Exercise:  Exercise at least 30 minutes per day on most days of the week  Some examples of exercise include walking, biking, dancing, and swimming  You can also fit in more physical activity by taking the stairs instead of the elevator or parking farther away from stores  Include muscle strengthening activities 2 days each week  Regular exercise provides many health benefits  It helps you manage your weight, and decreases your risk for type 2 diabetes, heart disease, stroke, and high blood pressure  Exercise can also help improve your mood  Ask your healthcare provider about the best exercise plan for you  General health and safety guidelines:   Do not smoke  Nicotine and other chemicals in cigarettes and cigars can cause lung damage  Ask your healthcare provider for information if you currently smoke and need help to quit  E-cigarettes or smokeless tobacco still contain nicotine  Talk to your healthcare provider before you use these products  Limit alcohol  A drink of alcohol is 12 ounces of beer, 5 ounces of wine, or 1½ ounces of liquor  Lose weight, if needed  Being overweight increases your risk of certain health conditions  These include heart disease, high blood pressure, type 2 diabetes, and certain types of cancer  Protect your skin  Do not sunbathe or use tanning beds  Use sunscreen with a SPF 15 or higher  Apply sunscreen at least 15 minutes before you go outside  Reapply sunscreen every 2 hours  Wear protective clothing, hats, and sunglasses when you are outside  Drive safely  Always wear your seatbelt  Make sure everyone in your car wears a seatbelt  A seatbelt can save your life if you are in an accident  Do not use your cell phone when you are driving  This could distract you and cause an accident   Pull over if you need to make a call or send a text message  Practice safe sex  Use latex condoms if are sexually active and have more than one partner  Your healthcare provider may recommend screening tests for sexually transmitted infections (STIs)  Wear helmets, lifejackets, and protective gear  Always wear a helmet when you ride a bike or motorcycle, go skiing, or play sports that could cause a head injury  Wear protective equipment when you play sports  Wear a lifejacket when you are on a boat or doing water sports  © Copyright [employer] 2022 Information is for End User's use only and may not be sold, redistributed or otherwise used for commercial purposes  All illustrations and images included in CareNotes® are the copyrighted property of A D A M , Inc  or Dante Mccauley   The above information is an  only  It is not intended as medical advice for individual conditions or treatments  Talk to your doctor, nurse or pharmacist before following any medical regimen to see if it is safe and effective for you  HPV (Human Papillomavirus) Vaccine for Adults   AMBULATORY CARE:   The human papillomavirus (HPV) vaccine  is an injection given to females and males to protect against human papillomavirus infection  HPV is most commonly spread through sexual activity  It can also be spread from a mother to her baby during delivery  The HPV vaccine is most effective if given before sexual activity begins  This allows your body to build almost complete protection against HPV before you have contact with the virus  The HPV vaccine is still effective after sexual activity has begun  How the vaccine is given:  The HPV vaccine can be given with other vaccines  The vaccine is given in 2 or 3 doses through age 32: The first dose  is given at any time  The second dose  is given 1 to 2 months after the first dose  The third dose, if needed,  is given 6 months after the first dose      Reasons you should not get the HPV vaccine, or should wait to get it: You had a severe allergic reaction to a dose of the vaccine  You are pregnant  Your healthcare provider will tell you when you can get the vaccine  You are sick or have a fever  You may need to wait to get the vaccine until symptoms go away  Risks of the HPV vaccine: You may have pain, redness, or swelling where the shot was given  You may have a fever or headache  You may have an allergic reaction to the vaccine  This can be life-threatening  Call your local emergency number (911 in the [address]) if:   You have signs of a severe allergic reaction, such as trouble breathing, hives, or wheezing  Seek care immediately if:   You have a high fever or behavior changes that concern you  Call your doctor if:   You have questions or concerns about the HPV vaccine  Apply a warm compress  to the area to relieve swelling and pain  Follow up with your doctor as directed:  Write down your questions so you remember to ask them during your visits  © Copyright [employer] 2022 Information is for End User's use only and may not be sold, redistributed or otherwise used for commercial purposes  All illustrations and images included in CareNotes® are the copyrighted property of A D A M , Inc  or [employer]   The above information is an  only  It is not intended as medical advice for individual conditions or treatments  Talk to your doctor, nurse or pharmacist before following any medical regimen to see if it is safe and effective for you  Perineal Hygiene     No soaps or feminine wash to the vulva  Use only water to cleanse, or water with [employer] or [employer] if necessary  No lotion to the area  Use only coconut oil for moisture if needed   No douching     Cotton underware, loose fitting clothing  Only perfume-free, dye-free laundry detergent, use a second rinse cycle   Avoid fabric softeners/dryer sheets        Coconut oil as a lubricant (if not using condoms) or another scent-free lubricant (Astroglide, Uberlube) if needed  Partner to avoid the same products as well  Over the counter probiotic to restore vaginal samia may be helpful as well     You may also look into Boric Acid vaginal suppositories to restore vaginal PH balance for up to 2 weeks as directed on the box  You may not use these if you are pregnant Kegel Exercises for Women   AMBULATORY CARE:   Kegel exercises  help strengthen your pelvic muscles  Pelvic muscles hold your pelvic organs, such as your bladder and uterus, in place  Kegel exercises help prevent or control problems with urine incontinence (leakage)  Incontinence may be caused by pregnancy, childbirth, or menopause  Contact your healthcare provider if:   You cannot feel your muscles tighten or relax  You continue to leak urine  You have questions or concerns about your condition or care  Use the correct muscles:  Pelvic muscles are the muscles you use to control urine flow  To target these muscles, stop and start the flow of urine several times  This will help you become familiar with how it feels to tighten and relax these muscles  How to do Kegel exercises:   Empty your bladder  You may lie down, stand up, or sit down to do these exercises  When you first try to do these exercises, it may be easier if you lie down  Tighten or squeeze your pelvic muscles slowly  It may feel like you are trying to hold back urine or gas  Hold this position for 3 seconds  Relax for 3 seconds  Repeat this cycle 10 times  Do 10 sets of Kegel exercises, at least 3 times a day  Do not hold your breath when you do Kegel exercises  Keep your stomach, back, and leg muscles relaxed  As your muscles get stronger, you will be able to hold the squeeze longer  Your healthcare provider may ask that you increase your pelvic muscle squeeze to 10 seconds  After you squeeze for 10 seconds, relax for 10 seconds      What else you should know:   Once you know how to do Kegel exercises, use different positions  You can do these exercises while you lie on the floor, sit at your desk or watch TV, and while you stand  You may notice improved bladder control within about 6 weeks  Tighten your pelvic muscles before you sneeze, cough, or lift to prevent urine leakage  Follow up with your doctor as directed:  Write down your questions so you remember to ask them during your visits  © Copyright [employer] 2022 Information is for End User's use only and may not be sold, redistributed or otherwise used for commercial purposes  All illustrations and images included in CareNotes® are the copyrighted property of A D A M , Inc  or [facility] [employer]   The above information is an  only  It is not intended as medical advice for individual conditions or treatments  Talk to your doctor, nurse or pharmacist before following any medical regimen to see if it is safe and effective for you  Your mammogram is normal, although your breast tissue is extremely dense which may hide small masses/limit the sensitivity of this mammogram    Based on your breast density, I would recommend follow up with an automated breast ultrasound (ABUS)  You should verify coverage of this test with your insurance company to be familiar with any out of pocket expenses  This could also be considered to be automatically done with your mammogram next year  Discuss with your provider at your next annual exam   I placed the order for your ABUS test in your chart  You can call central scheduling to choose an appt time convenient for you ([phone])  Continue with monthly breast self exam and annual mammograms

## 2022-06-23 ENCOUNTER — ANNUAL EXAM (OUTPATIENT)
Dept: OBGYN CLINIC | Facility: MEDICAL CENTER | Age: 37
End: 2022-06-23
Payer: COMMERCIAL

## 2022-06-23 VITALS
HEIGHT: 60 IN | SYSTOLIC BLOOD PRESSURE: 114 MMHG | DIASTOLIC BLOOD PRESSURE: 62 MMHG | BODY MASS INDEX: 25.25 KG/M2 | WEIGHT: 128.6 LBS

## 2022-06-23 DIAGNOSIS — R10.2 PELVIC PAIN: ICD-10-CM

## 2022-06-23 DIAGNOSIS — Z12.31 ENCOUNTER FOR SCREENING MAMMOGRAM FOR MALIGNANT NEOPLASM OF BREAST: ICD-10-CM

## 2022-06-23 DIAGNOSIS — R92.2 DENSE BREAST TISSUE ON MAMMOGRAM: Primary | ICD-10-CM

## 2022-06-23 DIAGNOSIS — Z80.3 FAMILY HX-BREAST MALIGNANCY: ICD-10-CM

## 2022-06-23 DIAGNOSIS — Z01.419 ENCOUNTER FOR GYNECOLOGICAL EXAMINATION WITHOUT ABNORMAL FINDING: ICD-10-CM

## 2022-06-23 PROCEDURE — 3008F BODY MASS INDEX DOCD: CPT | Performed by: OBSTETRICS & GYNECOLOGY

## 2022-06-23 PROCEDURE — 1036F TOBACCO NON-USER: CPT | Performed by: OBSTETRICS & GYNECOLOGY

## 2022-06-23 PROCEDURE — 99395 PREV VISIT EST AGE 18-39: CPT | Performed by: OBSTETRICS & GYNECOLOGY

## 2022-06-23 PROCEDURE — 99213 OFFICE O/P EST LOW 20 MIN: CPT | Performed by: OBSTETRICS & GYNECOLOGY

## 2022-06-23 NOTE — PROGRESS NOTES
Pt is here for her yearly, last annual/pap 4/21/21 norm/hpv neg, LMP 5/30, had vasectomy, Pt is sexually active,mammo 6/21/22 Birads 1 neg, Mom has been disganosed with triple neg breast cancer

## 2022-06-28 ENCOUNTER — TELEPHONE (OUTPATIENT)
Dept: GENETICS | Facility: CLINIC | Age: 37
End: 2022-06-28

## 2022-06-28 NOTE — TELEPHONE ENCOUNTER
I called Jose J Ventura to schedule a new patient appointment with the [facility]       Outcome:   I left a voice message encouraging the patient to call the genetics team at [phone] to schedule this appointment  Follow-up:   At this time the referral will be closed and we will wait to hear back from the patient regarding scheduling this appointment

## 2022-07-11 ENCOUNTER — TELEMEDICINE (OUTPATIENT)
Dept: FAMILY MEDICINE CLINIC | Facility: CLINIC | Age: 37
End: 2022-07-11
Payer: COMMERCIAL

## 2022-07-11 DIAGNOSIS — U07.1 COVID-19: Primary | ICD-10-CM

## 2022-07-11 PROCEDURE — 99213 OFFICE O/P EST LOW 20 MIN: CPT | Performed by: FAMILY MEDICINE

## 2022-07-11 NOTE — PROGRESS NOTES
COVID-19 Outpatient Progress Note    Assessment/Plan:    Problem List Items Addressed This Visit    None     Visit Diagnoses     COVID-19    -  Primary         Disposition:     Patient is not fully vaccinated and I recommended self quarantine for 5 days followed by strict mask use for an additional 5 days  If patient were to develop symptoms, they should immediately self isolate and call our office for further guidance  Discussed vitamin D, vitamin C, and/or zinc supplementation with patient  I have spent 15 minutes directly with the patient  Greater than 50% of this time was spent in counseling/coordination of care regarding: prognosis, instructions for management, patient and family education, risk factor reductions and impressions  Advised pt have mom reach out to baldomero ruiz to see if she should get prophylaxis  Pt to treat symptoms  Encounter provider Suellen Baumann MD    Provider located at [address] [place] [address] 110`  [address] [address] [phone]    Recent Visits  No visits were found meeting these conditions  Showing recent visits within past 7 days and meeting all other requirements  Today's Visits  Date Type Provider Dept   07/11/22 Telemedicine Suellen Baumann MD  [facility]    Showing today's visits and meeting all other requirements  Future Appointments  No visits were found meeting these conditions  Showing future appointments within next 150 days and meeting all other requirements     This virtual check-in was done via [address] and patient was informed that this is a secure, HIPAA-compliant platform  She agrees to proceed  Patient agrees to participate in a virtual check in via telephone or video visit instead of presenting to the office to address urgent/immediate medical needs  Patient is aware this is a billable service  After connecting through [facility], the patient was identified by name and date of birth   Charlene Hassan MARGA Hood was informed that this was a telemedicine visit and that the exam was being conducted confidentially over secure lines  My office door was closed  No one else was in the room  Sana Doe acknowledged consent and understanding of privacy and security of the telemedicine visit  I informed the patient that I have reviewed her record in Epic and presented the opportunity for her to ask any questions regarding the visit today  The patient agreed to participate  Verification of patient location:  Patient is located in the following state in which I hold an active license: [place]    Subjective:   Sana Doe is a 39 y o  female who is concerned about COVID-19  Patient's symptoms include fever, fatigue, malaise, nasal congestion, sore throat, anosmia, loss of taste, cough, nausea, vomiting, diarrhea, myalgias and headache      - Date of symptom onset: 7/5/2022  - Date of exposure: 7/2/2022      COVID-19 vaccination status: Not vaccinated    Exposure:   Contact with a person who is under investigation (PUI) for or who is positive for COVID-19 within the last 14 days?: Yes    Hospitalized recently for fever and/or lower respiratory symptoms?: No      Currently a healthcare worker that is involved in direct patient care?: No      Works in a special setting where the risk of COVID-19 transmission may be high? (this may include long-term care, correctional and shelter facilities; homeless shelters; assisted-living facilities and group homes ): No      Resident in a special setting where the risk of COVID-19 transmission may be high? (this may include long-term care, correctional and shelter facilities; homeless shelters; assisted-living facilities and group homes ): No      F[facility] [address] [facility] [address] [place] to [facility]  developed sx on 7/4  Arrived home yesterday when she lost sense of smell and taste, then tested positive   positive, kids negative  Worst day of sx was 7/5  Mom with Breast CA living with her,  in the house, sees baldomero ruiz  Mom was thankfully not on the trip with them    No results found for: Kalpana Saleem, [facility], [address] [facility], [address], [facility], [facility]  Past Medical History:   Diagnosis Date    Gestational diabetes     Type A blood, Rh negative      Past Surgical History:   Procedure Laterality Date    APPENDECTOMY  2008     Current Outpatient Medications   Medication Sig Dispense Refill    MULTIPLE VITAMINS PO Take by mouth      Multiple Vitamins-Minerals (MULTIVITAMIN ADULT PO) Take by mouth (Patient not taking: Reported on 6/23/2022)       No current facility-administered medications for this visit  Allergies   Allergen Reactions    Codeine Vomiting     Reaction Date: 08Feb2012;        Review of Systems   Constitutional: Positive for fatigue and fever  HENT: Positive for congestion and sore throat  Respiratory: Positive for cough  Gastrointestinal: Positive for diarrhea, nausea and vomiting  Musculoskeletal: Positive for myalgias  Neurological: Positive for headaches  Objective: There were no vitals filed for this visit  Physical Exam  Vitals reviewed  Constitutional:       Appearance: Normal appearance  She is well-developed  Pulmonary:      Effort: Pulmonary effort is normal    Musculoskeletal:      Cervical back: Normal range of motion and neck supple  Neurological:      Mental Status: She is alert and oriented to person, place, and time  Psychiatric:         Mood and Affect: Mood normal          Behavior: Behavior normal          Thought Content: Thought content normal          Judgment: Judgment normal          VIRTUAL VISIT DISCLAIMER    Elena Hood verbally agrees to participate in [employer]   Pt is aware that [employer] could be limited without vital signs or the ability to perform a full hands-on physical exam  Alena Hood understands she or the provider may request at any time to terminate the video visit and request the patient to seek care or treatment in person

## 2022-09-09 ENCOUNTER — OFFICE VISIT (OUTPATIENT)
Dept: URGENT CARE | Facility: CLINIC | Age: 37
End: 2022-09-09
Payer: COMMERCIAL

## 2022-09-09 VITALS
HEIGHT: 60 IN | WEIGHT: 122.6 LBS | RESPIRATION RATE: 18 BRPM | BODY MASS INDEX: 24.07 KG/M2 | OXYGEN SATURATION: 99 % | HEART RATE: 61 BPM | DIASTOLIC BLOOD PRESSURE: 72 MMHG | TEMPERATURE: 96.9 F | SYSTOLIC BLOOD PRESSURE: 110 MMHG

## 2022-09-09 DIAGNOSIS — R10.12 LEFT UPPER QUADRANT ABDOMINAL PAIN: Primary | ICD-10-CM

## 2022-09-09 LAB
SL AMB  POCT GLUCOSE, UA: NEGATIVE
SL AMB LEUKOCYTE ESTERASE,UA: ABNORMAL
SL AMB POCT BILIRUBIN,UA: NEGATIVE
SL AMB POCT BLOOD,UA: ABNORMAL
SL AMB POCT CLARITY,UA: CLEAR
SL AMB POCT COLOR,UA: YELLOW
SL AMB POCT KETONES,UA: NEGATIVE
SL AMB POCT NITRITE,UA: NEGATIVE
SL AMB POCT PH,UA: 6
SL AMB POCT SPECIFIC GRAVITY,UA: 1
SL AMB POCT URINE PROTEIN: NEGATIVE
SL AMB POCT UROBILINOGEN: 0.2

## 2022-09-09 PROCEDURE — 81002 URINALYSIS NONAUTO W/O SCOPE: CPT | Performed by: FAMILY MEDICINE

## 2022-09-09 PROCEDURE — 99213 OFFICE O/P EST LOW 20 MIN: CPT | Performed by: FAMILY MEDICINE

## 2022-09-09 PROCEDURE — 87086 URINE CULTURE/COLONY COUNT: CPT | Performed by: FAMILY MEDICINE

## 2022-09-09 NOTE — PROGRESS NOTES
[address]        NAME: Jovi Gomes is a 39 y o  female  : 1985    MRN: [id number]  DATE: 2022  TIME: 1:30 PM    Assessment and Plan   Left upper quadrant abdominal pain [R10 12]  1  Left upper quadrant abdominal pain  POCT urine dip    Urine culture     Midback and left-sided abdominal pain likely musculoskeletal in nature versus a GI issue  Urine dip was fairly unremarkable with trace blood and leukocyte esterase  Will check a urine culture though a positive result may only indicate an asymptomatic bacteriuria  If culture positive, will contact patient and check for persistence of symptoms  If symptomatic, will initiate an antibiotic  Patient Instructions     Follow up with PCP in 3-5 days  Proceed to  ER if symptoms worsen  Chief Complaint     Chief Complaint   Patient presents with    Abdominal Pain     LLQ pain on and off x several weeks  Now in LUQ and L side x 4 days - still on and off but radiates around back to L flank  Has sl  Temp 99 4 today  Feels some bloated - started menses 3 days ago  Denies dysuria, urgency or frequency, No dysuria, N/V/D  History of Present Illness       40 yo F presents today with concerns for possible UTI versus pyelonephritis  Has been experiencing L inguinal pain intermittently for the past 1 5-2 weeks  Denies any obvious dysuria, increased frequency or urgency, but is experience b/l flank pain associated with the inguinal and LLQ abdominal pain  No other associated Sxs such as F/C/CP/SOB/dizziness/HA  Is currently on her menstrual cycle which started 3 days ago  Last BM was today - normal caliber  Review of Systems   Review of Systems   Constitutional: Negative for chills and fever (99 4)  Respiratory: Negative for cough and shortness of breath  Cardiovascular: Negative for chest pain  Gastrointestinal: Negative for abdominal pain and nausea     Genitourinary: Positive for flank pain and vaginal bleeding (menses)  Negative for dysuria, frequency, hematuria, pelvic pain, urgency and vaginal discharge  Musculoskeletal: Positive for back pain  Neurological: Negative for dizziness and headaches  Current Medications       Current Outpatient Medications:     MULTIPLE VITAMINS PO, Take by mouth, Disp: , Rfl:     Current Allergies     Allergies as of 09/09/2022 - Reviewed 09/09/2022   Allergen Reaction Noted    Codeine Vomiting 04/21/2012            The following portions of the patient's history were reviewed and updated as appropriate: allergies, current medications, past family history, past medical history, past social history, past surgical history and problem list      Past Medical History:   Diagnosis Date    Gestational diabetes     Type A blood, Rh negative        Past Surgical History:   Procedure Laterality Date    APPENDECTOMY  2008       Family History   Problem Relation Age of Onset   Tahira Hinojosa Breast cancer Mother 59    Hypertension Father     No Known Problems Sister     No Known Problems Daughter     Diabetes Maternal Grandmother     Hypertension Paternal Grandmother     Hypertension Paternal Grandfather     Diabetes Maternal Aunt     Diabetes Maternal Uncle     Breast cancer Paternal Aunt          Medications have been verified  Objective   /72   Pulse 61   Temp (!) 96 9 °F (36 1 °C)   Resp 18   Ht 5' (1 524 m)   Wt 55 6 kg (122 lb 9 6 oz)   LMP 09/06/2022 (Exact Date)   SpO2 99%   BMI 23 94 kg/m²   Patient's last menstrual period was 09/06/2022 (exact date)  Physical Exam     Physical Exam  Vitals and nursing note reviewed  Constitutional:       General: She is in acute distress  Appearance: Normal appearance  She is well-developed and normal weight  She is not ill-appearing, toxic-appearing or diaphoretic  HENT:      Head: Normocephalic and atraumatic  Eyes:      General:         Right eye: No discharge  Left eye: No discharge  Conjunctiva/sclera: Conjunctivae normal    Cardiovascular:      Rate and Rhythm: Normal rate and regular rhythm  Pulmonary:      Effort: Pulmonary effort is normal  No respiratory distress  Breath sounds: Normal breath sounds  No wheezing, rhonchi or rales  Abdominal:      General: Abdomen is flat  Tenderness: There is abdominal tenderness in the left upper quadrant  Skin:     General: Skin is warm  Findings: No erythema  Neurological:      General: No focal deficit present  Mental Status: She is alert and oriented to person, place, and time  Psychiatric:         Mood and Affect: Mood normal  Mood is not anxious or depressed  Behavior: Behavior normal          Thought Content:  Thought content normal          Judgment: Judgment normal

## 2022-09-11 LAB — BACTERIA UR CULT: NORMAL

## 2022-09-20 ENCOUNTER — HOSPITAL ENCOUNTER (OUTPATIENT)
Dept: ULTRASOUND IMAGING | Facility: CLINIC | Age: 37
Discharge: HOME | End: 2022-09-20
Payer: COMMERCIAL

## 2022-09-20 ENCOUNTER — HOSPITAL ENCOUNTER (OUTPATIENT)
Dept: RADIOLOGY | Facility: MEDICAL CENTER | Age: 37
Discharge: HOME | End: 2022-09-20
Payer: COMMERCIAL

## 2022-09-20 DIAGNOSIS — R92.2 DENSE BREAST TISSUE ON MAMMOGRAM: ICD-10-CM

## 2022-09-20 DIAGNOSIS — R10.2 PELVIC PAIN: ICD-10-CM

## 2022-09-20 PROCEDURE — 76641 ULTRASOUND BREAST COMPLETE: CPT

## 2022-09-20 PROCEDURE — 76856 US EXAM PELVIC COMPLETE: CPT

## 2022-09-20 PROCEDURE — 76830 TRANSVAGINAL US NON-OB: CPT

## 2022-11-29 ENCOUNTER — APPOINTMENT (OUTPATIENT)
Dept: RADIOLOGY | Facility: AMBULARY SURGERY CENTER | Age: 37
End: 2022-11-29
Attending: STUDENT IN AN ORGANIZED HEALTH CARE EDUCATION/TRAINING PROGRAM

## 2022-11-29 ENCOUNTER — OFFICE VISIT (OUTPATIENT)
Dept: OBGYN CLINIC | Facility: CLINIC | Age: 37
End: 2022-11-29

## 2022-11-29 VITALS
HEIGHT: 60 IN | HEART RATE: 60 BPM | DIASTOLIC BLOOD PRESSURE: 86 MMHG | BODY MASS INDEX: 23.95 KG/M2 | SYSTOLIC BLOOD PRESSURE: 129 MMHG | WEIGHT: 122 LBS

## 2022-11-29 DIAGNOSIS — M67.40 GANGLION CYST: ICD-10-CM

## 2022-11-29 DIAGNOSIS — R22.31 MASS OF RIGHT WRIST: Primary | ICD-10-CM

## 2022-11-29 RX ORDER — GLUCOSAMINE/CHONDR SU A SOD 750-600 MG
TABLET ORAL
COMMUNITY

## 2022-11-29 RX ORDER — AMOXICILLIN 500 MG
CAPSULE ORAL
COMMUNITY

## 2022-11-29 RX ORDER — MULTIVIT WITH MINERALS/LUTEIN
TABLET ORAL
COMMUNITY

## 2022-11-29 RX ORDER — CHOLECALCIFEROL (VITAMIN D3) 25 MCG
TABLET,CHEWABLE ORAL
COMMUNITY

## 2022-11-29 RX ORDER — ACETYLCYSTEINE 600 MG
CAPSULE ORAL
COMMUNITY

## 2022-11-29 NOTE — PROGRESS NOTES
ORTHOPAEDIC HAND, WRIST, AND ELBOW OFFICE  VISIT       ASSESSMENT/PLAN:      Diagnoses and all orders for this visit:    Mass of right wrist  -     Cancel: XR wrist 3+ vw left; Future    Other orders  -     Acetylcysteine (NAC) 600 MG CAPS  -     Ascorbic Acid (vitamin C) 1000 MG tablet  -     B Complex-C-Folic Acid (Super B-Complex/Vit C/FA) TABS  -     Ginkgo Biloba 120 MG TABS  -     Omega-3 Fatty Acids (Fish Oil) 1200 MG CAPS  -     Zinc 50 MG CAPS      65-year-old female with a volar radial right wrist mass  I did discuss this is likely a ganglion cyst however, the only way to confirm this would be in the form of surgical intervention  Non operative versus surgical intervention was discussed  Discussed if she would like excision, would recommend further imaging with MRI or ultrasound  The patient would like to continue to monitor the cyst at this time  If it becomes more bothersome, we can discuss excision  Discussed that aspiration is an option, but would need to be done by IR and with a small mass, would likely be of low help  The patient verbalized understanding of exam findings and treatment plan  We engaged in the shared decision-making process and treatment options were discussed at length with the patient  Surgical and conservative management discussed today along with risks and benefits  Follow Up:  PRN       To Do Next Visit:       General Discussions:  Ganglion Cysts: The anatomy and physiology of the ganglion was discussed with the patient today in the office  Fluid leaking out of the joint surface typically creates a small sac, which can enlarge and cause pain or limitation of motion  Treatment options include observation, aspiration, or surgical incision were discussed with the patient today    Observation typically lead to resolution and approximately 10% of patients, aspiration results in resolution of approximately 50% of patients, and surgical excision leads to resolution in approximately 97% of patients  After discussion with the patient today, the patient voiced understanding of all treatment options  Stephanie Purcell MD  Attending, Orthopaedic Surgery  Hand, Wrist, and Elbow Surgery  [facility]    ____________________________________________________________________________________________________________________________________________      CHIEF COMPLAINT:  No chief complaint on file  SUBJECTIVE:  Everrett Schilder is a 39y o  year old LHD female who presents today for evaluation and treatment of right wrist mass  The patient notes a mass to the radial aspect of her wrist  She states she did bump her wrist on something and thought it disappeared however, now notes its more volar than before  She also states the mass is more firm  She also notes an occ click about her thumb            I have personally reviewed all the relevant PMH, PSH, SH, FH, Medications and allergies      PAST MEDICAL HISTORY:  Past Medical History:   Diagnosis Date   • Gestational diabetes    • Type A blood, Rh negative        PAST SURGICAL HISTORY:  Past Surgical History:   Procedure Laterality Date   • APPENDECTOMY  2008       FAMILY HISTORY:  Family History   Problem Relation Age of Onset   • Breast cancer Mother 59   • Hypertension Father    • No Known Problems Sister    • No Known Problems Daughter    • Diabetes Maternal Grandmother    • Hypertension Paternal Grandmother    • Hypertension Paternal Grandfather    • Diabetes Maternal Aunt    • Diabetes Maternal Uncle    • Breast cancer Paternal Aunt        SOCIAL HISTORY:  Social History     Tobacco Use   • Smoking status: Never   • Smokeless tobacco: Never   Vaping Use   • Vaping Use: Never used   Substance Use Topics   • Alcohol use: Yes     Comment: rare   • Drug use: No       MEDICATIONS:    Current Outpatient Medications:   •  Acetylcysteine (NAC) 600 MG CAPS, , Disp: , Rfl:   •  Ascorbic Acid (vitamin C) 1000 MG tablet, , Disp: , Rfl:   •  B Complex-C-Folic Acid (Super B-Complex/Vit C/FA) TABS, , Disp: , Rfl:   •  Ginkgo Biloba 120 MG TABS, , Disp: , Rfl:   •  MULTIPLE VITAMINS PO, Take by mouth, Disp: , Rfl:   •  Omega-3 Fatty Acids (Fish Oil) 1200 MG CAPS, , Disp: , Rfl:   •  Zinc 50 MG CAPS, , Disp: , Rfl:     ALLERGIES:  Allergies   Allergen Reactions   • Codeine Vomiting     Reaction Date: 08Feb2012;            REVIEW OF SYSTEMS:  Musculoskeletal:        As noted in HPI  All other systems reviewed and are negative  VITALS:  Vitals:    11/29/22 0909   BP: 129/86   Pulse: 60       LABS:  HgA1c:   Lab Results   Component Value Date    HGBA1C 5 7 (H) 07/11/2020     BMP:   Lab Results   Component Value Date    CALCIUM 8 9 07/11/2020    K 4 2 07/11/2020    CO2 31 07/11/2020     07/11/2020    BUN 12 07/11/2020    CREATININE 0 77 07/11/2020       _____________________________________________________  PHYSICAL EXAMINATION:  General: well developed and well nourished, alert, oriented times 3 and appears comfortable  Psychiatric: Normal  HEENT: Normocephalic, Atraumatic Trachea Midline, No torticollis  Pulmonary: No audible wheezing or respiratory distress   Abdomen/GI: Non tender, non distended   Cardiovascular: No pitting edema, 2+ radial pulse   Skin: No Erythema, No Lacerations, No Fluctuation  Neurovascular: Sensation Intact to the Median, Ulnar, Radial Nerve, Motor Intact to the Median, Ulnar, Radial Nerve and Pulses Intact  Musculoskeletal: Normal, except as noted in detailed exam and in HPI  MUSCULOSKELETAL EXAMINATION:  Right wrist  volar radial wrist 1 5 cm proximal to radiocarpal joint 3 by 3 mm mass, firm, and mobile   Full ROM  Full fist  NTTP thumb A1 pulley   Compartments soft  Brisk capillary refill     ___________________________________________________  STUDIES REVIEWED:  Images of the right wrist performed today were reviewed in PACS by Dr Tasha Bell and demonstrate  no osseous abnormalities  PROCEDURES PERFORMED:  Procedures  No Procedures performed today    _____________________________________________________      Scribe Attestation    I,:  Neeta Dunne MA am acting as a scribe while in the presence of the attending physician :       I,:  Matty Vazquez MD personally performed the services described in this documentation    as scribed in my presence :

## 2023-01-10 ENCOUNTER — OFFICE VISIT (OUTPATIENT)
Dept: OBGYN CLINIC | Facility: MEDICAL CENTER | Age: 38
End: 2023-01-10

## 2023-01-10 VITALS — SYSTOLIC BLOOD PRESSURE: 100 MMHG | WEIGHT: 126.8 LBS | BODY MASS INDEX: 24.76 KG/M2 | DIASTOLIC BLOOD PRESSURE: 72 MMHG

## 2023-01-10 DIAGNOSIS — B37.9 YEAST INFECTION: ICD-10-CM

## 2023-01-10 DIAGNOSIS — Z11.3 SCREENING FOR STDS (SEXUALLY TRANSMITTED DISEASES): ICD-10-CM

## 2023-01-10 DIAGNOSIS — N93.0 PCB (POST COITAL BLEEDING): Primary | ICD-10-CM

## 2023-01-10 DIAGNOSIS — N92.3 INTERMENSTRUAL BLEEDING: ICD-10-CM

## 2023-01-10 LAB
BV WHIFF TEST VAG QL: NEGATIVE
CLUE CELLS SPEC QL WET PREP: NEGATIVE
PH SMN: 4 [PH]
T VAGINALIS VAG QL WET PREP: NEGATIVE
YEAST VAG QL WET PREP: POSITIVE

## 2023-01-10 RX ORDER — FLUCONAZOLE 150 MG/1
TABLET ORAL
Qty: 2 TABLET | Refills: 0 | Status: SHIPPED | OUTPATIENT
Start: 2023-01-10 | End: 2023-01-14

## 2023-01-10 NOTE — PROGRESS NOTES
Ryan Montgomery  1985    S:  44 y o  female with c/o intermenstrual bleeding  She reports to very light spotting for 1-2 days, occurring about 2 weeks prior to menses  Not associated with any cramping or discharge  This is noted a single spot of blood on a pad daily, never heavier  Began about 5-6 months ago and has been occurring monthly  Menses are regular, monthly, not heavy  Denies dysmenorrhea or other cyclic sx  She is sexually active with her   They use his vasectomy for contraception  Admits to bleeding after intercourse  Does not happen with each encounter and is not associated with dryness  Occasionally has some position discomfort, but insure if this is related to bleeding  Had pelvic US ordered at her last annual 6/23/2022 due to "left lower pelvic twinges"  This was completed 9/20/22 and was unremarkable except for mild adenomyosis  Last pap smear 4/21/21 NILM/hpv neg     Review of Systems   Respiratory: Negative  Cardiovascular: Negative  Gastrointestinal: Negative for constipation and diarrhea  Genitourinary: Negative for difficulty urinating, pelvic pain, vaginal bleeding, vaginal discharge, itching or odor  O:  /72 (BP Location: Left arm, Patient Position: Sitting, Cuff Size: Standard)   Wt 57 5 kg (126 lb 12 8 oz)   LMP 12/21/2022   BMI 24 76 kg/m²      US pelvis 9/20/22  IMPRESSION:     1  Unremarkable ovaries      2   Mildly heterogeneous myometrium with refractory shadowing  No myometrial cysts are identified though mild adenomyosis is not excluded      3   Tiny cysts in the endometrium, nonspecific  Otherwise normal in appearance  Pap smear 4/21/21 NILM/hpv neg   Note from annual exam 6/23/2022 reviewed     Wet mount: + yeast  Negative BV and trichomonas  She appears well and is in no distress  Normocephalic, atraumatic     Normal respiratory effort  Abdomen is soft and nontender  External genitals are normal without lesions or rashes  Vagina is with scant thick white discharge  No bleeding  Cervix is without lesions or discharge  No CMT  Uterus is nontender, no masses  Adnexa are nontender, no pelvic masses appreciated  No focal neurological deficits  Normal mood, affect, and behavior  Assessment:     1  PCB (post coital bleeding)    - POCT wet mount  - Liquid-based pap, screening  - Chlamydia/GC amplified DNA by PCR    2  Intermenstrual bleeding    - Liquid-based pap, screening  - Chlamydia/GC amplified DNA by PCR    3  Yeast infection    - fluconazole (DIFLUCAN) 150 mg tablet; Take one tablet now, then one tablet in 3 days  Dispense: 2 tablet; Refill: 0    4  Screening for STDs (sexually transmitted diseases)    - Chlamydia/GC amplified DNA by PCR      Plan:  Findings of yeast on exam  Rx for diflucan sent to pharmacy  Use reviewed  Will check pap and GC/CT  Will notify with results  Encouraged use of lubricant and avoidance of irritating positions  If pain persists, could consider pelvic floor PT  She will report back if this is desired  Suspect intermenstrual bleeding is related to ovulation  Advised to begin keeping log of bleeding dates, pattern, and relation to other sx, activity, and IC  She will report back with this  Discussed plan pending results and persistence  To RTO for annual exam, sooner as needed

## 2023-01-13 LAB
C TRACH DNA SPEC QL NAA+PROBE: NEGATIVE
N GONORRHOEA DNA SPEC QL NAA+PROBE: NEGATIVE

## 2023-01-18 LAB
LAB AP GYN PRIMARY INTERPRETATION: NORMAL
Lab: NORMAL

## 2023-01-24 ENCOUNTER — TELEPHONE (OUTPATIENT)
Dept: OBGYN CLINIC | Facility: CLINIC | Age: 38
End: 2023-01-24

## 2023-01-24 NOTE — TELEPHONE ENCOUNTER
Per comm consent lm to pt with results and recommendations of pap and STD from [facility]   All Neg

## 2023-01-24 NOTE — TELEPHONE ENCOUNTER
----- Message from Manasa Skinner PA-C sent at 1/24/2023  8:37 AM EST -----  Will you please call to notify [place] that her Pap and HPV were normal  GC/CT was negative  I sent a [employer] message last week but has not been read       Thanks,  Flo Hahn

## 2023-02-06 ENCOUNTER — OFFICE VISIT (OUTPATIENT)
Dept: URGENT CARE | Facility: CLINIC | Age: 38
End: 2023-02-06

## 2023-02-06 VITALS
DIASTOLIC BLOOD PRESSURE: 67 MMHG | HEIGHT: 60 IN | SYSTOLIC BLOOD PRESSURE: 119 MMHG | RESPIRATION RATE: 18 BRPM | TEMPERATURE: 96.5 F | WEIGHT: 125 LBS | HEART RATE: 66 BPM | BODY MASS INDEX: 24.54 KG/M2 | OXYGEN SATURATION: 99 %

## 2023-02-06 DIAGNOSIS — J06.9 VIRAL UPPER RESPIRATORY TRACT INFECTION: Primary | ICD-10-CM

## 2023-02-06 NOTE — PROGRESS NOTES
[facility]        NAME: Diamante Ma is a 44 y o  female  : 1985    MRN: [id number]  DATE: 2023  TIME: 10:27 AM    Assessment and Plan   Viral upper respiratory tract infection [J06 9]  1  Viral upper respiratory tract infection          Patient Instructions   Viral upper respiratory infection  Recommend flonase nasal spray and sudafed for postnasal drip/cough  Warm salt water gargles  Rest, fluids and supportive care  May benefit from a cool mist humidifier on night stand  Tylenol/ibuprofen as needed for pain/fever    Follow up with PCP in 3-5 days  Proceed to  ER if symptoms worsen  Chief Complaint     Chief Complaint   Patient presents with   • Cough   • Sore Throat   • Headache     Symptoms x Wednesday, rapid covid x 2 negative          History of Present Illness       Song Rios is a 79-year-old female who presents to clinic complaining sore throat x6 days  She states started with a sore throat and then progressed to nasal congestion and cough  She states the cough is been present for the last 3 days and it is productive but she feels that there is more to cough up but she is not able to  She did COVID at home test yesterday was negative  She denies any fever, chills, fatigue, myalgias, ear pain, shortness of breath, nausea, vomiting, diarrhea, loss of taste or smell, recent travel, or exposure to anyone known COVID-positive  Review of Systems   Review of Systems   Constitutional: Negative for chills, fatigue and fever  HENT: Positive for congestion and sore throat  Negative for ear pain, rhinorrhea, sinus pressure and sinus pain  Respiratory: Positive for cough and chest tightness  Negative for shortness of breath  Gastrointestinal: Negative for diarrhea, nausea and vomiting  Musculoskeletal: Negative for myalgias  Neurological: Positive for headaches  Negative for dizziness and light-headedness           Current Medications       Current Outpatient Medications:   •  Acetylcysteine (NAC) 600 MG CAPS, , Disp: , Rfl:   •  Ascorbic Acid (vitamin C) 1000 MG tablet, , Disp: , Rfl:   •  B Complex-C-Folic Acid (Super B-Complex/Vit C/FA) TABS, , Disp: , Rfl:   •  Ginkgo Biloba 120 MG TABS, , Disp: , Rfl:   •  MULTIPLE VITAMINS PO, Take by mouth, Disp: , Rfl:   •  Omega-3 Fatty Acids (Fish Oil) 1200 MG CAPS, , Disp: , Rfl:   •  Zinc 50 MG CAPS, , Disp: , Rfl:     Current Allergies     Allergies as of 02/06/2023 - Reviewed 02/06/2023   Allergen Reaction Noted   • Codeine Vomiting 04/21/2012            The following portions of the patient's history were reviewed and updated as appropriate: allergies, current medications, past family history, past medical history, past social history, past surgical history and problem list      Past Medical History:   Diagnosis Date   • Gestational diabetes    • Type A blood, Rh negative        Past Surgical History:   Procedure Laterality Date   • APPENDECTOMY  2008       Family History   Problem Relation Age of Onset   • Breast cancer Mother    • Hypertension Father    • No Known Problems Sister    • No Known Problems Daughter    • Diabetes Maternal Grandmother    • Hypertension Paternal Grandmother    • Hypertension Paternal Grandfather    • Diabetes Maternal Aunt    • Diabetes Maternal Uncle    • Breast cancer Paternal Aunt          Medications have been verified  Objective   /67   Pulse 66   Temp (!) 96 5 °F (35 8 °C)   Resp 18   Ht 5' (1 524 m)   Wt 56 7 kg (125 lb)   LMP 02/01/2023   SpO2 99%   BMI 24 41 kg/m²   Patient's last menstrual period was 02/01/2023  Physical Exam     Physical Exam  Vitals and nursing note reviewed  Constitutional:       General: She is not in acute distress  Appearance: Normal appearance  She is not ill-appearing     HENT:      Right Ear: Tympanic membrane, ear canal and external ear normal       Left Ear: Tympanic membrane, ear canal and external ear normal       Nose: Congestion present  Mouth/Throat:      Mouth: Mucous membranes are moist       Pharynx: No oropharyngeal exudate or posterior oropharyngeal erythema  Cardiovascular:      Rate and Rhythm: Normal rate and regular rhythm  Heart sounds: Normal heart sounds  Pulmonary:      Effort: Pulmonary effort is normal       Breath sounds: Normal breath sounds  Lymphadenopathy:      Cervical: No cervical adenopathy  Neurological:      Mental Status: She is alert and oriented to person, place, and time     Psychiatric:         Mood and Affect: Mood normal          Behavior: Behavior normal

## 2023-02-09 ENCOUNTER — TELEPHONE (OUTPATIENT)
Dept: FAMILY MEDICINE CLINIC | Facility: CLINIC | Age: 38
End: 2023-02-09

## 2023-02-09 NOTE — TELEPHONE ENCOUNTER
Patient went to care now on Monday they advised patient to take delsym, nasal spray, and sudafed for post nasal upper repository  Patient is still not feeling any better and would like to know if Dr Erin Eid could start her on an antibiotic  Symptoms are stuffy nose, cough left earache   [facility]

## 2023-02-10 NOTE — TELEPHONE ENCOUNTER
Patient called back, still not feeling well, pretty much the same although her ears are starting to hurt  Offered patient appt to be evaluated, she is unable to make it  Told patient we cannot prescribe medication to the patients withougt an evaulation  Advised patient to see how it goes over the weekend, call Monday if no better, if gets worse return to urgent care for evaluation

## 2023-04-26 ENCOUNTER — HOSPITAL ENCOUNTER (OUTPATIENT)
Dept: RADIOLOGY | Facility: MEDICAL CENTER | Age: 38
Discharge: HOME | End: 2023-04-26

## 2023-04-26 DIAGNOSIS — R10.12 LUQ PAIN: ICD-10-CM

## 2023-05-02 ENCOUNTER — TELEPHONE (OUTPATIENT)
Dept: FAMILY MEDICINE CLINIC | Facility: CLINIC | Age: 38
End: 2023-05-02

## 2023-05-02 NOTE — TELEPHONE ENCOUNTER
Patient would like to order an MRI and follow up with GI  Could you please call the patient regarding Ultrasound results she was not aware of results and would like to speak to Dr Essie Eng to put her mind a ease   [phone]

## 2023-05-03 DIAGNOSIS — K21.9 GASTROESOPHAGEAL REFLUX DISEASE WITHOUT ESOPHAGITIS: ICD-10-CM

## 2023-05-03 DIAGNOSIS — K59.09 OTHER CONSTIPATION: ICD-10-CM

## 2023-05-03 DIAGNOSIS — R10.12 LUQ PAIN: Primary | ICD-10-CM

## 2023-05-10 ENCOUNTER — TELEPHONE (OUTPATIENT)
Dept: GASTROENTEROLOGY | Facility: CLINIC | Age: 38
End: 2023-05-10

## 2023-05-10 ENCOUNTER — OFFICE VISIT (OUTPATIENT)
Dept: GASTROENTEROLOGY | Facility: CLINIC | Age: 38
End: 2023-05-10

## 2023-05-10 VITALS
DIASTOLIC BLOOD PRESSURE: 79 MMHG | WEIGHT: 121.4 LBS | BODY MASS INDEX: 23.84 KG/M2 | SYSTOLIC BLOOD PRESSURE: 128 MMHG | HEART RATE: 66 BPM | HEIGHT: 60 IN

## 2023-05-10 DIAGNOSIS — R14.0 BLOATING: ICD-10-CM

## 2023-05-10 DIAGNOSIS — R10.12 LEFT UPPER QUADRANT ABDOMINAL PAIN: Primary | ICD-10-CM

## 2023-05-10 DIAGNOSIS — R19.4 ENCOUNTER FOR DIAGNOSTIC COLONOSCOPY DUE TO CHANGE IN BOWEL HABITS: ICD-10-CM

## 2023-05-10 NOTE — PROGRESS NOTES
[facility] - Outpatient Consultation  Magaly Nascimento 44 y o  female MRN: [id number]  Encounter: [id number]          ASSESSMENT AND PLAN:      1  Left upper quadrant abdominal pain  26-year-old female referred to us for left upper quadrant abdominal pain, symptoms started few months ago and progressively getting worse, pain is crampy in nature along with frequent burping, pain is not related to meals, eats throughout the day no nocturnal pain  She try dairy free and gluten-free diet but no improvement in the symptoms, ultrasound of abdomen was done which also came back okay  We will schedule for EGD, risk and benefit of the procedure was discussed, depend upon endoscopy finding we will discuss about further treatment plan  - EGD; Future    2  Bloating    - EGD; Future    3  Encounter for diagnostic colonoscopy due to change in bowel habits  She also complained about change in bowel habit with straining and passing hard stool, she denying any melena or rectal bleeding, she denying any family history of colon cancer, she consume good amount of fiber in the diet  She is taking multiple supplements which I advised to avoid it  We will do EGD and colonoscopy to gather, she will stay on clear liquid diet day before the procedure, she will drink low volume bowel prep and split dosing  - Colonoscopy; Future  - Sod Picosulfate-Mag Ox-Cit Acd 10-3 5-12 MG-GM -GM/175ML SOLN; Take 175 mL by mouth 1 (one) time for 1 dose  Dispense: 175 mL; Refill: 0    ______________________________________________________________________    HPI: 26-year-old female referred to us for left upper quadrant abdominal pain, symptoms started almost 6-month ago and progressively getting worse  She try dairy free and gluten-free diet but no improvement in her symptoms, her PCP order ultrasound of abdomen which came back okay    She also complained about frequent burping, she denying any nausea, no vomiting, she also complained about change in bowel habit with more straining with each bowel movement, she denying any melena or rectal bleeding, no weight loss, she denying any family history of colon cancer or stomach cancer, family history is positive for breast cancer, patient is up-to-date with mammogram   She is non-smoker, no chronic alcohol use, no abdominal surgery      REVIEW OF SYSTEMS:    CONSTITUTIONAL: Denies any fever, chills, rigors, and weight loss  HEENT: No earache or tinnitus  Denies hearing loss or visual disturbances  CARDIOVASCULAR: No chest pain or palpitations  RESPIRATORY: Denies any cough, hemoptysis, shortness of breath or dyspnea on exertion  GASTROINTESTINAL: As noted in the History of Present Illness  GENITOURINARY: No problems with urination  Denies any hematuria or dysuria  NEUROLOGIC: No dizziness or vertigo, denies headaches  MUSCULOSKELETAL: Denies any muscle or joint pain  SKIN: Denies skin rashes or itching  ENDOCRINE: Denies excessive thirst  Denies intolerance to heat or cold  PSYCHOSOCIAL: Denies depression or anxiety  Denies any recent memory loss         Historical Information   Past Medical History:   Diagnosis Date   • Gestational diabetes    • Type A blood, Rh negative      Past Surgical History:   Procedure Laterality Date   • APPENDECTOMY  2008     Social History   Social History     Substance and Sexual Activity   Alcohol Use Not Currently    Comment: rare     Social History     Substance and Sexual Activity   Drug Use Never     Social History     Tobacco Use   Smoking Status Never   Smokeless Tobacco Never     Family History   Problem Relation Age of Onset   • Breast cancer Mother    • Hypertension Father    • No Known Problems Sister    • No Known Problems Daughter    • Diabetes Maternal Grandmother    • Hypertension Paternal Grandmother    • Hypertension Paternal Grandfather    • Diabetes Maternal Aunt    • Diabetes Maternal Uncle    • Breast cancer Paternal Aunt • Calcium 04/13/2023 8 8    • AST 04/13/2023 22    • ALT 04/13/2023 30    • Alkaline Phosphatase 04/13/2023 67    • Total Protein 04/13/2023 7 0    • Albumin 04/13/2023 3 9    • Total Bilirubin 04/13/2023 0 57    • eGFR 04/13/2023 83    • TSH 3RD GENERATON 04/13/2023 3 000    • Lipase 04/13/2023 150    • Segmented % 04/13/2023 42 (L)    • Lymphocytes % 04/13/2023 40    • Monocytes % 04/13/2023 5    • Eosinophils, % 04/13/2023 3    • Basophils % 04/13/2023 3 (H)    • Atypical Lymphocytes % 04/13/2023 7 (H)    • Absolute Neutrophils 04/13/2023 2 10    • Lymphocytes Absolute 04/13/2023 2 00    • Monocytes Absolute 04/13/2023 0 25    • Eosinophils Absolute 04/13/2023 0 15    • Basophils Absolute 04/13/2023 0 15 (H)    • RBC Morphology 04/13/2023 Normal    • Platelet Estimate 77/50/4327 Adequate          Radiology Results:   US abdomen complete    Result Date: 4/27/2023  Narrative: ABDOMEN ULTRASOUND, COMPLETE INDICATION:   R10 12: Left upper quadrant pain  COMPARISON: June 27, 2007 TECHNIQUE:   Real-time ultrasound of the abdomen was performed with a curvilinear transducer with both volumetric sweeps and still imaging techniques  FINDINGS: PANCREAS: Portions of the pancreas are obscured by bowel gas  Visualized portions of the pancreas are unremarkable  AORTA AND IVC:  Visualized portions are normal for patient age  LIVER: Size:  Within normal range  The liver measures 13 2 cm in the midclavicular line  Contour:  Surface contour is smooth  Parenchyma:  Echogenicity and echotexture are within normal limits  Echogenic lesion in the right hepatic lobe measuring 1 3 x 1 x 1 1 cm  Limited imaging of the main portal vein shows it to be patent and hepatopetal  BILIARY: No gallbladder findings  No intrahepatic biliary dilatation  CBD measures 2 0 mm  No choledocholithiasis  KIDNEY: Right kidney measures 10 9 x 3 7 x 4 7  cm  Volume 99 5 mL Kidney within normal limits  Left kidney measures 10 5 x 5 5 x 4 4 cm   Volume 131 8 mL Kidney within normal limits  SPLEEN: Measures 8 7 x 8 7 x 3 6 cm  Volume 145 3 mL Within normal limits  ASCITES:  None  Impression: Echogenic lesion in the right hepatic lobe which may represent a hemangioma  Follow-up with MRI of the liver with contrast can be obtained for confirmation  The study was marked in EPIC for significant notification   Workstation performed: [id number]

## 2023-05-10 NOTE — TELEPHONE ENCOUNTER
Scheduled date of EGD/colonoscopy (as of today): 5/16/23  Physician performing EGD/colonoscopy:Lawernce  Location of EGD/colonoscopy: [facility]  Desired bowel prep reviewed with patient:Clenpiq  Instructions reviewed with patient by:Gladis IRIZARRY  Clearances:  None

## 2023-05-18 ENCOUNTER — ANESTHESIA (OUTPATIENT)
Dept: GASTROENTEROLOGY | Facility: HOSPITAL | Age: 38
End: 2023-05-18

## 2023-05-18 ENCOUNTER — ANESTHESIA EVENT (OUTPATIENT)
Dept: GASTROENTEROLOGY | Facility: HOSPITAL | Age: 38
End: 2023-05-18

## 2023-05-18 ENCOUNTER — HOSPITAL ENCOUNTER (OUTPATIENT)
Dept: GASTROENTEROLOGY | Facility: HOSPITAL | Age: 38
Setting detail: OUTPATIENT SURGERY
End: 2023-05-18
Attending: INTERNAL MEDICINE

## 2023-05-18 VITALS
RESPIRATION RATE: 13 BRPM | HEART RATE: 58 BPM | DIASTOLIC BLOOD PRESSURE: 62 MMHG | OXYGEN SATURATION: 100 % | TEMPERATURE: 97.2 F | SYSTOLIC BLOOD PRESSURE: 108 MMHG

## 2023-05-18 DIAGNOSIS — K21.9 GASTROESOPHAGEAL REFLUX DISEASE WITHOUT ESOPHAGITIS: Primary | ICD-10-CM

## 2023-05-18 DIAGNOSIS — R14.0 BLOATING: ICD-10-CM

## 2023-05-18 DIAGNOSIS — R19.4 ENCOUNTER FOR DIAGNOSTIC COLONOSCOPY DUE TO CHANGE IN BOWEL HABITS: ICD-10-CM

## 2023-05-18 DIAGNOSIS — R10.12 LEFT UPPER QUADRANT ABDOMINAL PAIN: ICD-10-CM

## 2023-05-18 LAB
EXT PREGNANCY TEST URINE: NEGATIVE
EXT. CONTROL: NORMAL

## 2023-05-18 RX ORDER — PROPOFOL 10 MG/ML
INJECTION, EMULSION INTRAVENOUS AS NEEDED
Status: DISCONTINUED | OUTPATIENT
Start: 2023-05-18 | End: 2023-05-18

## 2023-05-18 RX ORDER — LIDOCAINE HYDROCHLORIDE 20 MG/ML
INJECTION, SOLUTION EPIDURAL; INFILTRATION; INTRACAUDAL; PERINEURAL AS NEEDED
Status: DISCONTINUED | OUTPATIENT
Start: 2023-05-18 | End: 2023-05-18

## 2023-05-18 RX ORDER — SODIUM CHLORIDE, SODIUM LACTATE, POTASSIUM CHLORIDE, CALCIUM CHLORIDE 600; 310; 30; 20 MG/100ML; MG/100ML; MG/100ML; MG/100ML
INJECTION, SOLUTION INTRAVENOUS CONTINUOUS PRN
Status: DISCONTINUED | OUTPATIENT
Start: 2023-05-18 | End: 2023-05-18

## 2023-05-18 RX ORDER — ONDANSETRON 2 MG/ML
4 INJECTION INTRAMUSCULAR; INTRAVENOUS ONCE AS NEEDED
Status: CANCELLED | OUTPATIENT
Start: 2023-05-18

## 2023-05-18 RX ORDER — FENTANYL CITRATE 50 UG/ML
INJECTION, SOLUTION INTRAMUSCULAR; INTRAVENOUS AS NEEDED
Status: DISCONTINUED | OUTPATIENT
Start: 2023-05-18 | End: 2023-05-18

## 2023-05-18 RX ORDER — LIDOCAINE HYDROCHLORIDE 10 MG/ML
0.5 INJECTION, SOLUTION EPIDURAL; INFILTRATION; INTRACAUDAL; PERINEURAL ONCE AS NEEDED
Status: DISCONTINUED | OUTPATIENT
Start: 2023-05-18 | End: 2023-05-22 | Stop reason: HOSPADM

## 2023-05-18 RX ORDER — METOCLOPRAMIDE HYDROCHLORIDE 5 MG/ML
10 INJECTION INTRAMUSCULAR; INTRAVENOUS ONCE AS NEEDED
Status: CANCELLED | OUTPATIENT
Start: 2023-05-18

## 2023-05-18 RX ORDER — DIPHENHYDRAMINE HYDROCHLORIDE 50 MG/ML
12.5 INJECTION INTRAMUSCULAR; INTRAVENOUS ONCE AS NEEDED
Status: CANCELLED | OUTPATIENT
Start: 2023-05-18

## 2023-05-18 RX ORDER — PANTOPRAZOLE SODIUM 40 MG/1
40 TABLET, DELAYED RELEASE ORAL DAILY
Qty: 30 TABLET | Refills: 2 | Status: SHIPPED | OUTPATIENT
Start: 2023-05-18

## 2023-05-18 RX ORDER — SODIUM CHLORIDE, SODIUM LACTATE, POTASSIUM CHLORIDE, CALCIUM CHLORIDE 600; 310; 30; 20 MG/100ML; MG/100ML; MG/100ML; MG/100ML
125 INJECTION, SOLUTION INTRAVENOUS CONTINUOUS
Status: DISCONTINUED | OUTPATIENT
Start: 2023-05-18 | End: 2023-05-22 | Stop reason: HOSPADM

## 2023-05-18 RX ADMIN — PROPOFOL 40 MG: 10 INJECTION, EMULSION INTRAVENOUS at 09:45

## 2023-05-18 RX ADMIN — PROPOFOL 40 MG: 10 INJECTION, EMULSION INTRAVENOUS at 09:50

## 2023-05-18 RX ADMIN — SODIUM CHLORIDE, SODIUM LACTATE, POTASSIUM CHLORIDE, AND CALCIUM CHLORIDE: .6; .31; .03; .02 INJECTION, SOLUTION INTRAVENOUS at 09:06

## 2023-05-18 RX ADMIN — PROPOFOL 20 MG: 10 INJECTION, EMULSION INTRAVENOUS at 09:38

## 2023-05-18 RX ADMIN — PROPOFOL 40 MG: 10 INJECTION, EMULSION INTRAVENOUS at 09:41

## 2023-05-18 RX ADMIN — FENTANYL CITRATE 50 MCG: 50 INJECTION, SOLUTION INTRAMUSCULAR; INTRAVENOUS at 09:40

## 2023-05-18 RX ADMIN — FENTANYL CITRATE 25 MCG: 50 INJECTION, SOLUTION INTRAMUSCULAR; INTRAVENOUS at 09:36

## 2023-05-18 RX ADMIN — LIDOCAINE HYDROCHLORIDE 100 MG: 20 INJECTION, SOLUTION EPIDURAL; INFILTRATION; INTRACAUDAL; PERINEURAL at 09:33

## 2023-05-18 RX ADMIN — PROPOFOL 100 MG: 10 INJECTION, EMULSION INTRAVENOUS at 09:33

## 2023-05-18 RX ADMIN — FENTANYL CITRATE 25 MCG: 50 INJECTION, SOLUTION INTRAMUSCULAR; INTRAVENOUS at 09:33

## 2023-05-18 RX ADMIN — PROPOFOL 40 MG: 10 INJECTION, EMULSION INTRAVENOUS at 09:35

## 2023-05-18 NOTE — ANESTHESIA PREPROCEDURE EVALUATION
Procedure:  COLONOSCOPY  EGD    Relevant Problems   ANESTHESIA (within normal limits)      CARDIO (within normal limits)      ENDO (within normal limits)      GI/HEPATIC   (+) Gastroesophageal reflux disease without esophagitis      /RENAL (within normal limits)      GYN (within normal limits)      HEMATOLOGY (within normal limits)      MUSCULOSKELETAL (within normal limits)      NEURO/PSYCH (within normal limits)      PULMONARY (within normal limits)        Physical Exam    Airway    Mallampati score: II  TM Distance: >3 FB  Neck ROM: full     Dental   No notable dental hx     Cardiovascular  Rhythm: regular, Rate: normal, Cardiovascular exam normal    Pulmonary  Pulmonary exam normal Breath sounds clear to auscultation,     Other Findings        Anesthesia Plan  ASA Score- 2     Anesthesia Type- IV sedation with anesthesia with ASA Monitors  Additional Monitors:   Airway Plan:           Plan Factors-Exercise tolerance (METS): >4 METS  Chart reviewed  EKG reviewed  Imaging results reviewed  Existing labs reviewed  Patient summary reviewed  Induction- intravenous  Postoperative Plan-     Informed Consent- Anesthetic plan and risks discussed with patient  I personally reviewed this patient with the CRNA  Discussed and agreed on the Anesthesia Plan with the CRNA  Sheridan Chin

## 2023-05-18 NOTE — H&P
History and Physical -  [facility]  Boris Balderrama 44 y o  female MRN: [id number]                  HPI: Boris Balderrama is a 44y o  year old female who presents for abdominal pain, bloating and change in bowel habit      REVIEW OF SYSTEMS: Per the HPI, and otherwise unremarkable  Historical Information   Past Medical History:   Diagnosis Date   • Gestational diabetes    • Type A blood, Rh negative      Past Surgical History:   Procedure Laterality Date   • APPENDECTOMY  2008     Social History   Social History     Substance and Sexual Activity   Alcohol Use Not Currently    Comment: rare     Social History     Substance and Sexual Activity   Drug Use Never     Social History     Tobacco Use   Smoking Status Never   Smokeless Tobacco Never     Family History   Problem Relation Age of Onset   • Breast cancer Mother    • Hypertension Father    • No Known Problems Sister    • No Known Problems Daughter    • Diabetes Maternal Grandmother    • Hypertension Paternal Grandmother    • Hypertension Paternal Grandfather    • Diabetes Maternal Aunt    • Diabetes Maternal Uncle    • Breast cancer Paternal Aunt        Meds/Allergies     (Not in a hospital admission)      Allergies   Allergen Reactions   • Codeine Vomiting     Reaction Date: 08Feb2012;        Objective     LMP 05/04/2023       PHYSICAL EXAM    Gen: NAD  CV: RRR  CHEST: Clear  ABD: soft, NT/ND  EXT: no edema      ASSESSMENT/PLAN:  This is a 44y o  year old female here for EGD and colonoscopy, and she is stable and optimized for her procedure

## 2023-05-18 NOTE — ANESTHESIA POSTPROCEDURE EVALUATION
Post-Op Assessment Note    CV Status:  Stable  Pain Score: 0    Pain management: adequate     Mental Status:  Alert and awake   Hydration Status:  Euvolemic   PONV Controlled:  Controlled   Airway Patency:  Patent      Post Op Vitals Reviewed: Yes      Staff: CRNA, Anesthesiologist         No notable events documented      BP   92/52   Temp      Pulse  49   Resp   16   SpO2   99

## 2023-05-22 ENCOUNTER — OFFICE VISIT (OUTPATIENT)
Dept: FAMILY MEDICINE CLINIC | Facility: CLINIC | Age: 38
End: 2023-05-22

## 2023-05-22 VITALS
HEART RATE: 60 BPM | DIASTOLIC BLOOD PRESSURE: 64 MMHG | HEIGHT: 60 IN | OXYGEN SATURATION: 98 % | TEMPERATURE: 97.8 F | WEIGHT: 120.8 LBS | SYSTOLIC BLOOD PRESSURE: 114 MMHG | BODY MASS INDEX: 23.71 KG/M2

## 2023-05-22 DIAGNOSIS — Z00.00 ANNUAL PHYSICAL EXAM: Primary | ICD-10-CM

## 2023-05-22 NOTE — PROGRESS NOTES
[facility]    NAME: Lynsey Pandya  AGE: 44 y o  SEX: female  : 1985     DATE: 2023     Assessment and Plan:     Problem List Items Addressed This Visit    None  Visit Diagnoses     Annual physical exam    -  Primary    Relevant Orders    Lipid panel    Comprehensive metabolic panel          Immunizations and preventive care screenings were discussed with patient today  Appropriate education was printed on patient's after visit summary  Counseling:  · Exercise: the importance of regular exercise/physical activity was discussed  Recommend exercise 3-5 times per week for at least 30 minutes  Return in about 1 year (around 2024)  Chief Complaint:     Chief Complaint   Patient presents with   • Annual Exam      History of Present Illness:     Adult Annual Physical   Patient here for a comprehensive physical exam  The patient reports no problems  Diet and Physical Activity  · Diet/Nutrition: well balanced diet and consuming 3-5 servings of fruits/vegetables daily  · Exercise: moderate cardiovascular exercise, 3-4 times a week on average and 30-60 minutes on average  Depression Screening  PHQ-2/9 Depression Screening         General Health  · Sleep: sleeps well and gets 7-8 hours of sleep on average  · Hearing: normal - bilateral   · Vision: goes for regular eye exams and wears contacts  · Dental: regular dental visits  /GYN Health  · Last menstrual period: 5/3/23, irreg spotting, working with GYN  · Contraceptive method: barrier methods  · History of STDs?: no   · No DV, good calcium intake     Review of Systems:     Review of Systems   Constitutional: Negative for activity change, appetite change, fatigue and fever  HENT: Negative for congestion, sinus pressure and sinus pain  Eyes: Negative for visual disturbance  Respiratory: Negative for chest tightness and shortness of breath  Cardiovascular: Negative for chest pain and palpitations  Gastrointestinal: Negative for abdominal pain, constipation and diarrhea  Genitourinary: Positive for menstrual problem  Negative for difficulty urinating  Musculoskeletal: Negative for arthralgias and myalgias  Skin: Negative for rash  Neurological: Positive for numbness  Negative for weakness and headaches  Light-headedness:  R hand, works with [employer]  Hematological: Does not bruise/bleed easily  Psychiatric/Behavioral: Negative for dysphoric mood and sleep disturbance  The patient is not nervous/anxious         Past Medical History:     Past Medical History:   Diagnosis Date   • Gestational diabetes    • Type A blood, Rh negative       Past Surgical History:     Past Surgical History:   Procedure Laterality Date   • APPENDECTOMY  2008      Social History:     Social History     Socioeconomic History   • Marital status: /Civil Union     Spouse name: None   • Number of children: None   • Years of education: None   • Highest education level: None   Occupational History   • None   Tobacco Use   • Smoking status: Never   • Smokeless tobacco: Never   Vaping Use   • Vaping Use: Never used   Substance and Sexual Activity   • Alcohol use: Not Currently     Comment: rare   • Drug use: Never   • Sexual activity: Yes     Partners: Male     Birth control/protection: Male Sterilization     Comment:    Other Topics Concern   • None   Social History Narrative    Always uses a seatbelt    Coffee    Per [employer]: Single     Social Determinants of Health     Financial Resource Strain: Not on file   Food Insecurity: Not on file   Transportation Needs: Not on file   Physical Activity: Not on file   Stress: Not on file   Social Connections: Not on file   Intimate Partner Violence: Not on file   Housing Stability: Not on file      Family History:     Family History   Problem Relation Age of Onset   • Breast cancer Mother    • Hypertension Father • No Known Problems Sister    • No Known Problems Daughter    • Diabetes Maternal Grandmother    • Hypertension Paternal Grandmother    • Hypertension Paternal Grandfather    • Diabetes Maternal Aunt    • Diabetes Maternal Uncle    • Breast cancer Paternal Aunt       Current Medications:     Current Outpatient Medications   Medication Sig Dispense Refill   • Acetylcysteine (NAC) 600 MG CAPS      • Ascorbic Acid (vitamin C) 1000 MG tablet      • B Complex-C-Folic Acid (Super B-Complex/Vit C/FA) TABS      • Ginkgo Biloba 120 MG TABS      • MULTIPLE VITAMINS PO Take by mouth     • Omega-3 Fatty Acids (Fish Oil) 1200 MG CAPS      • Zinc 50 MG CAPS      • pantoprazole (PROTONIX) 40 mg tablet Take 1 tablet (40 mg total) by mouth daily 30 tablet 2     No current facility-administered medications for this visit  Allergies: Allergies   Allergen Reactions   • Codeine Vomiting     Reaction Date: 08Feb2012;       Physical Exam:     /64 (BP Location: Right arm, Patient Position: Sitting, Cuff Size: Standard)   Pulse 60   Temp 97 8 °F (36 6 °C)   Ht 5' (1 524 m)   Wt 54 8 kg (120 lb 12 8 oz)   LMP 05/04/2023   SpO2 98%   BMI 23 59 kg/m²     Physical Exam  Vitals reviewed  Constitutional:       Appearance: Normal appearance  She is well-developed  HENT:      Right Ear: Tympanic membrane, ear canal and external ear normal       Left Ear: Tympanic membrane, ear canal and external ear normal    Eyes:      Conjunctiva/sclera: Conjunctivae normal       Pupils: Pupils are equal, round, and reactive to light  Cardiovascular:      Rate and Rhythm: Normal rate and regular rhythm  Heart sounds: Normal heart sounds  Pulmonary:      Effort: Pulmonary effort is normal       Breath sounds: Normal breath sounds  Abdominal:      General: Bowel sounds are normal       Palpations: Abdomen is soft  Musculoskeletal:         General: Normal range of motion        Cervical back: Normal range of motion and neck supple  Right lower leg: No edema  Left lower leg: No edema  Lymphadenopathy:      Cervical: No cervical adenopathy  Skin:     General: Skin is warm  Capillary Refill: Capillary refill takes less than 2 seconds  Neurological:      Mental Status: She is alert and oriented to person, place, and time  Coordination: Coordination normal       Deep Tendon Reflexes: Reflexes normal    Psychiatric:         Mood and Affect: Mood normal          Behavior: Behavior normal          Thought Content:  Thought content normal          Judgment: Judgment normal           MD Michelle LombardoWe[facility] Hospitals[facility]

## 2023-05-26 DIAGNOSIS — R14.0 BLOATING: ICD-10-CM

## 2023-05-26 DIAGNOSIS — R10.12 LEFT UPPER QUADRANT ABDOMINAL PAIN: Primary | ICD-10-CM

## 2023-05-30 PROBLEM — Z01.419 ENCOUNTER FOR GYNECOLOGICAL EXAMINATION (GENERAL) (ROUTINE) WITHOUT ABNORMAL FINDINGS: Status: RESOLVED | Noted: 2020-01-30 | Resolved: 2023-05-30

## 2023-05-30 NOTE — PROGRESS NOTES
Diagnoses and all orders for this visit:    Encounter for gynecological examination without abnormal finding        Perineal hygiene reviewed   Weight bearing exercises minium of 150 mins/weekly advised  Kegel exercises recommended  SBE encouraged, ASCCP guidelines reviewed  Condoms encouraged with all sexual activity to prevent STI's  Gardisil vaccines recommended up to age 39  Calcium/ Vit D dietary requirements discussed,   Advised to call with any issues,  all concerns & questions addressed  See provided information in your after visit summary   Continue to monitor bleeding patterns   F/U Annually and PRN      Health Maintenance:    Last PAP: 01/10/2023 neg/neg, 21 Neg/Neg  Next PAP [id number]    Last Mammogram: 2022  Diagnostic  The breasts are heterogeneously dense, which may obscure small masses  Lifetime Chidi: 17 92 % Bi RADS 1   ABUS with Neg findings, recommended routine annual screening at the age of 35     Last Colonoscopy: 2023    RTO 10 years     Gardisil: Not completed       Subjective    CC: Yearly Exam      Alice Waterman is a 44 y o  female here for an annual exam  [id number]  GYN hx includes:  1  , 1 vaginal delivery, hx of GDM, post coital bleeding   No personal Hx of breast, cervical, ovarian or colon CA  Family hx of: Mother with Triple Neg BC, recent diagnosis , PA with [facility]   Mother had genetic testing, not hereditary, no need for pt to be tested   She does not want to consult with breast surgeon  Pt was given referral to genetic oncology as well as the breast specialist, not completed at this time   Medically stable, reports no changes in medical Hx, follows with PMD    Patient's last menstrual period was 2023  Her menstrual cycles are regular every 28-30 days  She denies issues with bleeding during her menses historically, however post COVID she mentioned irregular cycles which have been heavier at times, she had significant hair loss as well  Was seen for an office appt with [facility] for post coital bleeding as well, pap collected with Neg results   Recent TSH normal      We discussed continued monitoring of cycles as stress and illness can effect bleeding patterns  She asked about hormone testing, we discussed the insignificance of this testing in a woman with mostly regular cycles  We can discuss further if she continues with concerns in the future   Pt has also been having GI issues recently as well   Denies history of abnormal pap smear  She denies breast concerns, abnormal vaginal discharge, vaginal itching, odor, irritation, bowel/bladder dysfunction, urinary symptoms, pelvic pain, or dyspareunia today  She is sexually active  Monogamous relationship  Her current method of contraception includes male vasectomy Denies any issues with her BCM  She does not want STD testing today    Denies intimate partner violence    Past Medical History:   Diagnosis Date   • Gestational diabetes    • Type A blood, Rh negative      Past Surgical History:   Procedure Laterality Date   • APPENDECTOMY  2008   • WISDOM TOOTH EXTRACTION         Immunization History   Administered Date(s) Administered   • Influenza Quadrivalent Preservative Free 3 years and older IM 02/27/2015   • Tdap 10/04/2010, 05/26/2015       Family History   Problem Relation Age of Onset   • Breast cancer Mother    • Hypertension Father    • No Known Problems Sister    • No Known Problems Daughter    • Diabetes Maternal Grandmother    • Hypertension Paternal Grandmother    • Hypertension Paternal Grandfather    • Diabetes Maternal Aunt    • Diabetes Maternal Uncle    • Breast cancer Paternal Aunt      Social History     Tobacco Use   • Smoking status: Never   • Smokeless tobacco: Never   Vaping Use   • Vaping Use: Never used   Substance Use Topics   • Alcohol use: Not Currently     Comment: rare   • Drug use: Never       Current Outpatient Medications:   •  Ascorbic Acid (vitamin C) 1000 MG tablet, , Disp: , Rfl:   •  B Complex-C-Folic Acid (Super B-Complex/Vit C/FA) TABS, , Disp: , Rfl:   •  MULTIPLE VITAMINS PO, Take by mouth, Disp: , Rfl:   •  Omega-3 Fatty Acids (Fish Oil) 1200 MG CAPS, , Disp: , Rfl:   •  pantoprazole (PROTONIX) 40 mg tablet, Take 1 tablet (40 mg total) by mouth daily, Disp: 30 tablet, Rfl: 2  •  Zinc 50 MG CAPS, , Disp: , Rfl:   Patient Active Problem List    Diagnosis Date Noted   • Bilateral breast lump 2022   • Gastroesophageal reflux disease without esophagitis 2022   • Other constipation 2022   • Increased frequency of urination 2022   • Hirsutism 2021       Allergies   Allergen Reactions   • Codeine Vomiting     Reaction Date: 2012;        OB History    Para Term  AB Living   2 2 2     2   SAB IAB Ectopic Multiple Live Births           2      # Outcome Date GA Lbr Floyd/2nd Weight Sex Delivery Anes PTL Lv   2 Term      CS-LTranv   DIONI   1 Term      Vag-Spont   DIONI       Vitals:    23 0848   BP: 110/72   BP Location: Right arm   Patient Position: Sitting   Cuff Size: Large   Weight: 52 6 kg (116 lb)   Height: 5' (1 524 m)     Body mass index is 22 65 kg/m²  Review of Systems     Constitutional: Negative for chills, fatigue, fever, headaches, visual disturbances, and unexpected weight change  Respiratory: Negative for cough, & shortness of breath  Cardiovascular: Negative for chest pain       Gastrointestinal: Negative for Abd pain, nausea & vomiting, constipation and diarrhea  Genitourinary: Negative for difficulty urinating, dysuria, hematuria, dyspareunia, unusual vaginal bleeding or discharge  Skin: Negative skin changes    Physical Exam     Constitutional: Alert & Oriented x3, well-developed and well-nourished  No distress  HENT: Atraumatic, Normocephalic, Conjunctivae clear  Neck: Normal range of motion  Neck supple  No thyromegaly, mass, nodules or tenderness  Pulmonary: Effort normal    Abdominal: Soft   No tenderness or masses  Musculoskeletal: Normal ROM  Skin: Warm & Dry  Psychological: Normal mood, thought content, behavior & judgement     Breasts:   Right: tissue soft without masses, tenderness, skin changes or nipple discharge  No areas of erythema or pain  No subclavicular, axillary, pectoral adenopathy  Left:  tissue soft without masses, tenderness, skin changes or nipple discharge  No areas of erythema or pain  No subclavicular, axillary, pectoral adenopathy    Pelvic exam was performed with patient supine, lithotomy position  Labia: Negative rash, tenderness, lesion or injury on the right labia  Negative rash, tenderness, lesion or injury on the left labia  Urethral meatus:  Negative for  tenderness, inflammation or discharge  Uterus: not deviated, enlarged, fixed or tender  Cervix: No CMT, no discharge or friability  Right adnexa: no mass, no tenderness and no fullness  Left adnexa: no mass, no tenderness and no fullness  Vagina: No erythema, tenderness, masses, or foreign body in the vagina  No signs of injury around the vagina  No unusual vaginal discharge   Perineum without lesions, signs of injury, erythema or swelling  Inguinal Canal:        Right: No inguinal adenopathy or hernia present  Left: No inguinal adenopathy or hernia present

## 2023-05-30 NOTE — PROGRESS NOTES
[id number]  1  Vaginal and 1 c- section   LMP: 5/25/2023  PMB:  SA:  YES   HPV:  NO   Birth control:  NO   Last pap: 01/10/2023  Last mammo: 06/21/2022:  Last colonoscopy: 05/18/2023  Last Dexa: Not on file  Family History:     Mother- Breast cancer   ( alive )   Paternal Aunt - Breast Cancer   (D)

## 2023-05-31 ENCOUNTER — OFFICE VISIT (OUTPATIENT)
Dept: GASTROENTEROLOGY | Facility: CLINIC | Age: 38
End: 2023-05-31

## 2023-05-31 ENCOUNTER — ANNUAL EXAM (OUTPATIENT)
Dept: OBGYN CLINIC | Facility: CLINIC | Age: 38
End: 2023-05-31

## 2023-05-31 VITALS
DIASTOLIC BLOOD PRESSURE: 68 MMHG | HEART RATE: 70 BPM | BODY MASS INDEX: 22.78 KG/M2 | SYSTOLIC BLOOD PRESSURE: 125 MMHG | HEIGHT: 60 IN | WEIGHT: 116 LBS

## 2023-05-31 VITALS
DIASTOLIC BLOOD PRESSURE: 72 MMHG | WEIGHT: 116 LBS | BODY MASS INDEX: 22.78 KG/M2 | SYSTOLIC BLOOD PRESSURE: 110 MMHG | HEIGHT: 60 IN

## 2023-05-31 DIAGNOSIS — R19.4 ENCOUNTER FOR DIAGNOSTIC COLONOSCOPY DUE TO CHANGE IN BOWEL HABITS: ICD-10-CM

## 2023-05-31 DIAGNOSIS — K21.9 GASTROESOPHAGEAL REFLUX DISEASE WITHOUT ESOPHAGITIS: ICD-10-CM

## 2023-05-31 DIAGNOSIS — K76.9 LIVER LESION, RIGHT LOBE: ICD-10-CM

## 2023-05-31 DIAGNOSIS — Z01.419 ENCOUNTER FOR GYNECOLOGICAL EXAMINATION WITHOUT ABNORMAL FINDING: Primary | ICD-10-CM

## 2023-05-31 DIAGNOSIS — K22.70 BARRETT'S ESOPHAGUS WITHOUT DYSPLASIA: ICD-10-CM

## 2023-05-31 DIAGNOSIS — R10.12 LEFT UPPER QUADRANT ABDOMINAL PAIN: Primary | ICD-10-CM

## 2023-05-31 DIAGNOSIS — R14.0 BLOATING: ICD-10-CM

## 2023-05-31 NOTE — PROGRESS NOTES
[facility] - Outpatient Follow-up Note  Antony Johnston 44 y o  female MRN: [id number]  Encounter: [id number]          ASSESSMENT AND PLAN:      1  Left upper quadrant abdominal pain  2  Bloating  Patient with left upper quadrant abdominal pain and this could be related to gas trapping of the splenic flexure but will have additional abdominal imaging if she is having MRI of the abdomen, patient with bloating symptoms and did have increased intraepithelial lymphocytes on EGD and should have celiac panel which I instructed her to have done, currently advised her to not have any dietary restrictions    3  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  Patient with intestinal metaplasia in lower esophagus and will repeat EGD in 1 year I did advise her that she does not necessarily need to stay on pantoprazole because she does not have significant reflux symptoms and we will proceed with continuing pantoprazole for about 2 months and then she can taper off and we will see her back in about 6 months for a follow-up    4  Gastroesophageal reflux disease without esophagitis  Denies any significant reflux symptoms and plan is outlined as above    5  Encounter for diagnostic colonoscopy due to change in bowel habits  Colonoscopy with biopsies did not reveal any microscopic colitis, repeat colonoscopy in 10 years    6  Liver lesion  Patient with a liver lesion which likely is hemangioma with upcoming MRI    We will follow-up in 6 months for office visit and advised to call if any problems or questions occur in the interim  Discussed dietary recommendations and use of herbal supplements   ______________________________________________________________________    SUBJECTIVE:      53-year-old female who presents for follow-up to EGD and colonoscopy  Patient previously was having symptoms of  left upper quadrant abdominal pain, for almost 6-month ago and progressively getting worse    She tried dairy free and gluten-free diet but no improvement in her symptoms, her PCP order ultrasound of abdomen which came back okay  She also complained about frequent burping, she denying any nausea, no vomiting, she also complained about change in bowel habit with more straining with each bowel movement  Underwent recent EGD and colonoscopy  EGD had shown mild gastritis and biopsies of lower esophagus showed intestinal metaplasia and biopsies of duodenum did show benign duodenal mucosa with increased intraepithelial lymphocytes and no villous atrophy  Patient had biopsies of colon to evaluate for any microscopic colitis which was negative  Biopsies of terminal ileum did show no evidence of ileitis  Repeat EGD recommended in 1 year and colonoscopy due in 10 years  Celiac panel was ordered for further evaluation since findings of increased intraepithelial lymphocytes were noted  Patient is stating that she is restricting what she is eating and has lost about 4 pounds this week because she did not want to eat acidic foods and was trying to stick to a bland diet due to findings of gastritis on imaging  She never had reflux symptoms and she actually states that she gets this left upper quadrant pain which then sometimes can migrate to the left lower quadrant and then gets better when she has a bowel movement  She otherwise just started again the pantoprazole in the past week and she reports that she is getting an MRI to evaluate for her liver hemangioma on June 5  REVIEW OF SYSTEMS IS OTHERWISE NEGATIVE        Historical Information   Past Medical History:   Diagnosis Date   • Gestational diabetes    • Type A blood, Rh negative      Past Surgical History:   Procedure Laterality Date   • APPENDECTOMY  2008   • WISDOM TOOTH EXTRACTION       Social History   Social History     Substance and Sexual Activity   Alcohol Use Not Currently    Comment: rare     Social History     Substance and Sexual Activity   Drug Use Never     Social History     Tobacco Use   Smoking Status Never   Smokeless Tobacco Never     Family History   Problem Relation Age of Onset   • Breast cancer Mother    • Hypertension Father    • No Known Problems Sister    • No Known Problems Daughter    • Diabetes Maternal Grandmother    • Hypertension Paternal Grandmother    • Hypertension Paternal Grandfather    • Diabetes Maternal Aunt    • Diabetes Maternal Uncle    • Breast cancer Paternal Aunt        Meds/Allergies       Current Outpatient Medications:   •  Ascorbic Acid (vitamin C) 1000 MG tablet  •  B Complex-C-Folic Acid (Super B-Complex/Vit C/FA) TABS  •  MULTIPLE VITAMINS PO  •  Omega-3 Fatty Acids (Fish Oil) 1200 MG CAPS  •  pantoprazole (PROTONIX) 40 mg tablet  •  Zinc 50 MG CAPS    Allergies   Allergen Reactions   • Codeine Vomiting     Reaction Date: 08Feb2012;            Objective     Blood pressure 125/68, pulse 70, height 5' (1 524 m), weight 52 6 kg (116 lb), last menstrual period 05/04/2023  Body mass index is 22 65 kg/m²  PHYSICAL EXAM:      General Appearance:   Alert, cooperative, no distress   HEENT:   Normocephalic, atraumatic, anicteric      Neck:  Supple, symmetrical, trachea midline   Lungs:   Clear to auscultation bilaterally; no rales, rhonchi or wheezing; respirations unlabored    Heart[de-identified]   Regular rate and rhythm; no murmur, rub, or gallop  Abdomen:   Soft, non-tender, non-distended; normal bowel sounds; no masses, no organomegaly    Genitalia:   Deferred    Rectal:   Deferred    Extremities:  No cyanosis, clubbing or edema    Pulses:  2+ and symmetric    Skin:  No jaundice, rashes, or lesions    Lymph nodes:  No palpable cervical lymphadenopathy        Lab Results:   No visits with results within 1 Day(s) from this visit     Latest known visit with results is:   Hospital Outpatient Visit on 05/18/2023   Component Date Value   • EXT Preg Test, Ur 05/18/2023 Negative    • Control 05/18/2023 Valid    • Case Report 05/18/2023 Value:Surgical Pathology Report                         Case: [id number]                                   Authorizing Provider:  Lauren Lozano MD Collected:           05/18/2023 1310              Ordering Location:     [address] [place] [address]   Received:            05/18/2023 1517                                     Endoscopy                                                                    Pathologist:           Antwan Galdamez MD                                                         Specimens:   A) - Duodenum, cold bx small bowel  r/o celiac                                                      B) - Stomach, cold bx gastritis r/o Hpylori                                                         C) - Esophagus, cold bx lower esophagus  reflux                                                     D) - Terminal Ileum, cold bx                                                                        E) - Large Intestine, Right/Ascending Colon, cold bx Right colon                                                              F) - Large Intestine, Left/Descending Colon, cold bx left colon                           • Final Diagnosis 05/18/2023                      Value: This result contains rich text formatting which cannot be displayed here  • Note 05/18/2023                      Value: This result contains rich text formatting which cannot be displayed here  • Additional Information 05/18/2023                      Value: This result contains rich text formatting which cannot be displayed here  • Gross Description 05/18/2023                      Value: This result contains rich text formatting which cannot be displayed here     • Clinical Information 05/18/2023                      Value:· Moderate, localized erythematous mucosa in the body of the stomach and antrum, consistent with gastritis; performed cold forceps biopsy to rule out H  pylori  · The upper third of the esophagus, middle third of the esophagus and lower third of the esophagus appeared normal  Performed random biopsy using biopsy forceps to rule out Wood's esophagus  · The GE junction appeared normal   · The duodenal bulb, 1st part of the duodenum and 2nd part of the duodenum appeared normal  Performed random biopsy using biopsy forceps to rule out celiac disease  · All observed locations appeared normal, including the terminal ileum  Performed 2 random biopsies using biopsy forceps  · All observed locations appeared normal, including the ileocecal valve, cecum, ascending colon, hepatic flexure, transverse colon, splenic flexure, descending colon, sigmoid colon, rectosigmoid and rectum  Performed 6 random biopsies using biopsy forceps  · Three medium, prolapsing, external, internal (grade 3)                           hemorrhoids, located externally at three o'clock and six o'clock and internally at three o'clock, six o'clock and ten o'clock observed during digital rectal exam; no bleeding was identified         Radiology Results:   Colonoscopy    Result Date: 5/18/2023  Narrative: Table formatting from the original result was not included  SKYLER [address] [facility] [address] [facility] [phone] DATE OF SERVICE: 5/18/23 PHYSICIAN(S): Attending: Carlos Davila MD Fellow: No Staff Documented Procedure : Colonoscopy with cold biopsies INDICATION: Encounter for diagnostic colonoscopy due to change in bowel habits POST-OP DIAGNOSIS: See the impression below  HISTORY: Prior colonoscopy: No prior colonoscopy  BOWEL PREPARATION: Clenpiq PREPROCEDURE: Informed consent was obtained for the procedure, including sedation  Risks including but not limited to bleeding, infection, perforation, adverse drug reaction and aspiration were explained in detail  Also explained about less than 100% sensitivity with the exam and other alternatives  The patient was placed in the left lateral decubitus position   Procedure: Colonoscopy DETAILS OF PROCEDURE: Patient was taken to the procedure room where a time out was performed to confirm correct patient and correct procedure  The patient underwent monitored anesthesia care, which was administered by an anesthesia professional  The patient's blood pressure, heart rate, level of consciousness, oxygen and respirations were monitored throughout the procedure  A digital rectal exam was performed  The scope was introduced through the anus and advanced to the terminal ileum  Retroflexion was performed in the rectum  The quality of bowel preparation was evaluated using the [facility] Bowel Preparation Scale with scores of: right colon = 2, transverse colon = 2, left colon = 2  The total BBPS score was 6  Bowel prep was adequate  The patient experienced no blood loss  The procedure was not difficult  The patient tolerated the procedure well  There were no apparent complications  ANESTHESIA INFORMATION: ASA: II Anesthesia Type: IV Sedation with Anesthesia MEDICATIONS: No administrations occurring from 0921 to 0957 on 05/18/23 FINDINGS: All observed locations appeared normal, including the terminal ileum  Performed 2 random biopsies using biopsy forceps  All observed locations appeared normal, including the ileocecal valve, cecum, ascending colon, hepatic flexure, transverse colon, splenic flexure, descending colon, sigmoid colon, rectosigmoid and rectum  Performed 6 random biopsies using biopsy forceps   Three medium, prolapsing, external, internal (grade 3) hemorrhoids, located externally at three o'clock and six o'clock and internally at three o'clock, six o'clock and ten o'clock observed during digital rectal exam; no bleeding was identified EVENTS: Procedure Events Event Event Time ENDO CECUM REACHED 5/18/2023  9:46 AM ENDO SCOPE OUT TIME 5/18/2023  9:56 AM SPECIMENS: ID Type Source Tests Collected by Time Destination 1 : cold bx small bowel  r/o celiac Tissue Duodenum TISSUE EXAM Magdalene Kwok MD 5/18/2023  9:36 AM  2 : cold bx gastritis r/o Hpylori Tissue Stomach TISSUE EXAM Eleanor Solorio MD 5/18/2023  9:37 AM  3 : cold bx lower esophagus  reflux Tissue Esophagus TISSUE EXAM Eleanor Solorio MD 5/18/2023  9:38 AM  4 : cold bx Tissue Terminal Ileum TISSUE EXAM Eleanor Solorio MD 5/18/2023  9:49 AM  5 : cold bx Right colon Tissue Large Intestine, Right/Ascending Colon TISSUE EXAM Eleanor Solorio MD 5/18/2023  9:51 AM  6 : cold bx left colon Tissue Large Intestine, Left/Descending Colon TISSUE EXAM Eleanor Solorio MD 5/18/2023  9:54 AM  EQUIPMENT: Colonoscope -[id number]     Impression: Normal colonic mucosa Normal terminal ileum Internal and external hemorrhoid RECOMMENDATION:  Repeat screening colonoscopy in 10 years  Await pathology results     Anusol HC hemorrhoidal cream applied around perianal area nightly     High Fiber diet  Eleanor Solorio MD     EGD    Result Date: 5/18/2023  Narrative: Table formatting from the original result was not included  [facility] [place] [id number] [facility] [id number] [facility] [phone] DATE OF SERVICE: 5/18/23 PHYSICIAN(S): Attending: Eleanor Solorio MD Fellow: No Staff Documented Procedure : EGD with Biopsies INDICATION: Bloating, Left upper quadrant abdominal pain POST-OP DIAGNOSIS: See the impression below  PREPROCEDURE: Informed consent was obtained for the procedure, including sedation  Risks of perforation, hemorrhage, adverse drug reaction and aspiration were discussed  The patient was placed in the left lateral decubitus position  Patient was explained about the risks and benefits of the procedure  Risks including but not limited to bleeding, infection, and perforation were explained in detail  Also explained about less than 100% sensitivity with the exam and other alternatives   PROCEDURE: EGD DETAILS OF PROCEDURE: Patient was taken to the procedure room where a time out was performed to confirm correct patient and correct procedure  The patient underwent monitored anesthesia care, which was administered by an anesthesia professional  The patient's blood pressure, heart rate, level of consciousness, respirations and oxygen were monitored throughout the procedure  The scope was introduced through the mouth and advanced to the second part of the duodenum  Retroflexion was performed in the fundus  Prior to the procedure, the patient's H  Pylori status was unknown  The patient experienced no blood loss  The procedure was not difficult  The patient tolerated the procedure well  There were no apparent complications  ANESTHESIA INFORMATION: ASA: II Anesthesia Type: IV Sedation with Anesthesia MEDICATIONS: No administrations occurring from 0921 to 0941 on 05/18/23 FINDINGS: Moderate, localized erythematous mucosa in the body of the stomach and antrum, consistent with gastritis; performed cold forceps biopsy to rule out H  pylori The upper third of the esophagus, middle third of the esophagus and lower third of the esophagus appeared normal  Performed random biopsy using biopsy forceps to rule out Wood's esophagus  The GE junction appeared normal  The duodenal bulb, 1st part of the duodenum and 2nd part of the duodenum appeared normal  Performed random biopsy using biopsy forceps to rule out celiac disease  SPECIMENS: ID Type Source Tests Collected by Time Destination 1 : cold bx small bowel  r/o celiac Tissue Duodenum TISSUE EXAM Ruthie Young MD 5/18/2023  9:36 AM  2 : cold bx gastritis r/o Hpylori Tissue Stomach TISSUE EXAM Ruthie Young MD 5/18/2023  9:37 AM  3 : cold bx lower esophagus  reflux Tissue Esophagus TISSUE EXAM Ruthie Young MD 5/18/2023  9:38 AM      Impression: 1  Mild gastritis 2  Normal esophagus, normal GE junction, normal duodenum, normal small bowel villi RECOMMENDATION:  Await pathology results  Follow up with me in clinic    Pantoprazole 40 mg p o  daily    Anti  reflux diet, avoid late dinner and small meals   Lady Sneha MD

## 2023-05-31 NOTE — PATIENT INSTRUCTIONS
Breast Self Exam for Women   AMBULATORY CARE:   A breast self-exam (BSE)  is a way to check your breasts for lumps and other changes  Regular BSEs can help you know how your breasts normally look and feel  Most breast lumps or changes are not cancer, but you should always have them checked by a healthcare provider  Why you should do a BSE:  Breast cancer is the most common type of cancer in women  Even if you have mammograms, you may still want to do a BSE regularly  If you know how your breasts normally feel and look, it may help you know when to contact your healthcare provider  Mammograms can miss some cancers  You may find a lump during a BSE that did not show up on a mammogram   When you should do a BSE:  If you have periods, you may want to do your BSE 1 week after your period ends  This is the time when your breasts may be the least swollen, lumpy, or tender  You can do regular BSEs even if you are breastfeeding or have breast implants  Call your doctor if:   You find any lumps or changes in your breasts  You have breast pain or fluid coming from your nipples  You have questions or concerns about your condition or care  How to do a BSE:       Look at your breasts in a mirror  Look at the size and shape of each breast and nipple  Check for swelling, lumps, dimpling, scaly skin, or other skin changes  Look for nipple changes, such as a nipple that is painful or beginning to pull inward  Gently squeeze both nipples and check to see if fluid (that is not breast milk) comes out of them  If you find any of these or other breast changes, contact your healthcare provider  Check your breasts while you sit or  the following 3 positions:    [place] your arms down at your sides  Raise your hands and join them behind your head  Put firm pressure with your hands on your hips  Bend slightly forward while you look at your breasts in the mirror  Lie down and feel your breasts    When you lie down, your breast tissue spreads out evenly over your chest  This makes it easier for you to feel for lumps and anything that may not be normal for your breasts  Do a BSE on one breast at a time  Place a small pillow or towel under your left shoulder  Put your left arm behind your head  Use the 3 middle fingers of your right hand  Use your fingertip pads, on the top of your fingers  Your fingertip pad is the most sensitive part of your finger  Use small circles to feel your breast tissue  Use your fingertip pads to make dime-sized, overlapping circles on your breast and armpits  Use light, medium, and firm pressure  First, press lightly  Second, press with medium pressure to feel a little deeper into the breast  Last, use firm pressure to feel deep within your breast     Examine your entire breast area  Examine the breast area from above the breast to below the breast where you feel only ribs  Make small circles with your fingertips, starting in the middle of your armpit  Make circles going up and down the breast area  Continue toward your breast and all the way across it  Examine the area from your armpit all the way over to the middle of your chest (breastbone)  Stop at the middle of your chest     Move the pillow or towel to your right shoulder, and put your right arm behind your head  Use the 3 fingertip pads of your left hand, and repeat the above steps to do a BSE on your right breast   What else you can do to check for breast problems or cancer:  Talk to your healthcare provider about mammograms  A mammogram is an x-ray of your breasts to screen for breast cancer or other problems  Your provider can tell you the benefits and risks of mammograms  The first mammogram is usually at age 39 or 48  Your provider may recommend you start at 36 or younger if your risk for breast cancer is high  Mammograms usually continue every 1 to 2 years until age 76         Follow up with your doctor as directed:  Write down your questions so you remember to ask them during your visits  © [employer] 2022 Information is for End User's use only and may not be sold, redistributed or otherwise used for commercial purposes  The above information is an  only  It is not intended as medical advice for individual conditions or treatments  Talk to your doctor, nurse or pharmacist before following any medical regimen to see if it is safe and effective for you  Wellness Visit for Adults   AMBULATORY CARE:   A wellness visit  is when you see your healthcare provider to get screened for health problems  Your healthcare provider will also give you advice on how to stay healthy  Write down your questions so you remember to ask them  Ask your healthcare provider how often you should have a wellness visit  What happens at a wellness visit:  Your healthcare provider will ask about your health, and your family history of health problems  This includes high blood pressure, heart disease, and cancer  He or she will ask if you have symptoms that concern you, if you smoke, and about your mood  You may also be asked about your intake of medicines, supplements, food, and alcohol  Any of the following may be done: Your weight  will be checked  Your height may also be checked so your body mass index (BMI) can be calculated  Your BMI shows if you are at a healthy weight  Your blood pressure  and heart rate will be checked  Your temperature may also be checked  Blood and urine tests  may be done  Blood tests may be done to check your cholesterol levels  Abnormal cholesterol levels increase your risk for heart disease and stroke  You may also need a blood or urine test to check for diabetes if you are at increased risk  Urine tests may be done to look for signs of an infection or kidney disease  A physical exam  includes checking your heartbeat and lungs with a stethoscope   Your healthcare provider may also check your skin to look for sun damage  Screening tests  may be recommended  A screening test is done to check for diseases that may not cause symptoms  The screening tests you may need depend on your age, gender, family history, and lifestyle habits  For example, colorectal screening may be recommended if you are 48years old or older  Screening tests you need if you are a woman:   A Pap smear  is used to screen for cervical cancer  Pap smears are usually done every 3 to 5 years depending on your age  You may need them more often if you have had abnormal Pap smear test results in the past  Ask your healthcare provider how often you should have a Pap smear  A mammogram  is an x-ray of your breasts to screen for breast cancer  Experts recommend mammograms every 2 years starting at age 48 years  You may need a mammogram at age 52 years or younger if you have an increased risk for breast cancer  Talk to your healthcare provider about when you should start having mammograms and how often you need them  Vaccines you may need:   Get an influenza vaccine  every year  The influenza vaccine protects you from the flu  Several types of viruses cause the flu  The viruses change over time, so new vaccines are made each year  Get a tetanus-diphtheria (Td) booster vaccine  every 10 years  This vaccine protects you against tetanus and diphtheria  Tetanus is a severe infection that may cause painful muscle spasms and lockjaw  Diphtheria is a severe bacterial infection that causes a thick covering in the back of your mouth and throat  Get a human papillomavirus (HPV) vaccine  if you are female and aged 23 to 32 or male 23 to 24 and never received it  This vaccine protects you from HPV infection  HPV is the most common infection spread by sexual contact  HPV may also cause vaginal, penile, and anal cancers  Get a pneumococcal vaccine  if you are aged 72 years or older   The pneumococcal vaccine is an injection given to protect you from pneumococcal disease  Pneumococcal disease is an infection caused by pneumococcal bacteria  The infection may cause pneumonia, meningitis, or an ear infection  Get a shingles vaccine  if you are 60 or older, even if you have had shingles before  The shingles vaccine is an injection to protect you from the varicella-zoster virus  This is the same virus that causes chickenpox  Shingles is a painful rash that develops in people who had chickenpox or have been exposed to the virus  How to eat healthy:  My Plate is a model for planning healthy meals  It shows the types and amounts of foods that should go on your plate  Fruits and vegetables make up about half of your plate, and grains and protein make up the other half  A serving of dairy is included on the side of your plate  The amount of calories and serving sizes you need depends on your age, gender, weight, and height  Examples of healthy foods are listed below:  Eat a variety of vegetables  such as dark green, red, and orange vegetables  You can also include canned vegetables low in sodium (salt) and frozen vegetables without added butter or sauces  Eat a variety of fresh fruits , canned fruit in 100% juice, frozen fruit, and dried fruit  Include whole grains  At least half of the grains you eat should be whole grains  Examples include whole-wheat bread, wheat pasta, brown rice, and whole-grain cereals such as oatmeal     Eat a variety of protein foods such as seafood (fish and shellfish), lean meat, and poultry without skin (turkey and chicken)  Examples of lean meats include pork leg, shoulder, or tenderloin, and beef round, sirloin, tenderloin, and extra lean ground beef  Other protein foods include eggs and egg substitutes, beans, peas, soy products, nuts, and seeds  Choose low-fat dairy products such as skim or 1% milk or low-fat yogurt, cheese, and cottage cheese  Limit unhealthy fats  such as butter, hard margarine, and shortening  Exercise:  Exercise at least 30 minutes per day on most days of the week  Some examples of exercise include walking, biking, dancing, and swimming  You can also fit in more physical activity by taking the stairs instead of the elevator or parking farther away from stores  Include muscle strengthening activities 2 days each week  Regular exercise provides many health benefits  It helps you manage your weight, and decreases your risk for type 2 diabetes, heart disease, stroke, and high blood pressure  Exercise can also help improve your mood  Ask your healthcare provider about the best exercise plan for you  General health and safety guidelines:   Do not smoke  Nicotine and other chemicals in cigarettes and cigars can cause lung damage  Ask your healthcare provider for information if you currently smoke and need help to quit  E-cigarettes or smokeless tobacco still contain nicotine  Talk to your healthcare provider before you use these products  Limit alcohol  A drink of alcohol is 12 ounces of beer, 5 ounces of wine, or 1½ ounces of liquor  Lose weight, if needed  Being overweight increases your risk of certain health conditions  These include heart disease, high blood pressure, type 2 diabetes, and certain types of cancer  Protect your skin  Do not sunbathe or use tanning beds  Use sunscreen with a SPF 15 or higher  Apply sunscreen at least 15 minutes before you go outside  Reapply sunscreen every 2 hours  Wear protective clothing, hats, and sunglasses when you are outside  Drive safely  Always wear your seatbelt  Make sure everyone in your car wears a seatbelt  A seatbelt can save your life if you are in an accident  Do not use your cell phone when you are driving  This could distract you and cause an accident  Pull over if you need to make a call or send a text message  Practice safe sex  Use latex condoms if are sexually active and have more than one partner   Your healthcare provider may recommend screening tests for sexually transmitted infections (STIs)  Wear helmets, lifejackets, and protective gear  Always wear a helmet when you ride a bike or motorcycle, go skiing, or play sports that could cause a head injury  Wear protective equipment when you play sports  Wear a lifejacket when you are on a boat or doing water sports  © Copyright Crist[employer] 2022 Information is for End User's use only and may not be sold, redistributed or otherwise used for commercial purposes  The above information is an  only  It is not intended as medical advice for individual conditions or treatments  Talk to your doctor, nurse or pharmacist before following any medical regimen to see if it is safe and effective for you  Kegel Exercises for Women   AMBULATORY CARE:   Kegel exercises  help strengthen your pelvic muscles  Pelvic muscles hold your pelvic organs, such as your bladder and uterus, in place  Kegel exercises help prevent or control certain conditions, such as urine incontinence (leakage) or uterine prolapse  Call your doctor or physical therapist if:   You cannot feel your muscles tighten or relax  You continue to leak urine  You have questions or concerns about your condition or care  Use the correct muscles:  Pelvic muscles are the muscles you use to control urine flow  To target these muscles, stop and start the flow of urine several times  This will help you become familiar with how it feels to tighten and relax these muscles  How to do Kegel exercises:   Get into a comfortable position  You may lie down, stand up, or sit down to do these exercises  When you first try to do these exercises, it may be easier if you lie down  Tighten or squeeze your pelvic muscles slowly  It may feel like you are trying to hold back urine or gas  Hold this position for 3 seconds  Relax for 3 seconds  Repeat this cycle 10 times   Do not hold your breath when you do Kegel exercises  Keep your stomach, back, and leg muscles relaxed  Do 10 sets of Kegel exercises, at least 3 times a day  When you know how to do Kegel exercises, use different positions  This will help to strengthen your pelvic muscles as much as possible  You can do these exercises while you lie on the floor, watch TV, or while you stand  Tighten your pelvic muscles before you sneeze, cough, or lift to prevent urine leakage  You may notice improved bladder control within about 6 weeks  Follow up with your doctor or physical therapist as directed:  Write down your questions so you remember to ask them during your visits  © Copyright Cheryl Morgan 2022 Information is for End User's use only and may not be sold, redistributed or otherwise used for commercial purposes  The above information is an  only  It is not intended as medical advice for individual conditions or treatments  Talk to your doctor, nurse or pharmacist before following any medical regimen to see if it is safe and effective for you  Perineal Hygiene      Your vaginal naturally takes care of its self, it is a self washing system, the less you mess the healthier it will be     No soaps or feminine wash to the vulva, these products can cause dermitis, bacterial infections and other vulvar problems  Use only water to cleanse, or water with Dove or [employer] if necessary  No scented lotions or products are advised in or near your vulva  Use only coconut oil for moisture if needed  No douching this may cause imbalance in your vaginal PH and further issues  If you wear panty liners, you may apply a thin coating of Vaseline, A&D ointment or coconut oil to the vulvar tissues as a skin barrier     [place] underware, loose fitting clothing  Only perfume-free, dye-free laundry detergent, use a second rinse cycle   Avoid fabric softeners/dryer sheets  Partner should avoid the same products as well         Over the counter probiotic to restore vaginal samia may be helpful as well, take daily  You may also look into Boric Acid vaginal suppositories to restore vaginal PH balance for up to 2 weeks as directed on the box  You may not use these if you are pregnant      For vaginal dryness: You may use:     Coconut oil (organic, pure, unscented) as needed for moisture or lubrication  ( Do not use if allergic)       Replens moisture restore external comfort gel daily ( use as directed on the box)        Replens long lasting vaginal moisturizer  ( use as directed on the box)         For Vaginal Lubrication:          You may use:     Coconut oil (organic, pure, unscented) as a lubricant or another scent-free lubricant (Astroglide, Uberlube) if needed  Do not use coconut oil or silicone if using a condom as this may break down the integrity of the condom and cause an unplanned pregnancy              Do not use coconut oil if allergic               Replens silky smooth lubricant, premium silicone based lubricant for intercourse  ( use as directed, a small amount will provide an enhanced natural feeling)     Any premium over the counter vaginal lubricant water or silicone based  Silicone based will have more staying power  Menopause   WHAT YOU NEED TO KNOW:   What is menopause? Menopause is a normal stage in a woman's life when her monthly periods stop  You are considered to be in menopause when you have not had a period for a full year after the age of 36  Menopause usually occurs between ages 52 to 48  Perimenopause is a stage before menopause that may cause signs and symptoms similar to menopause  Perimenopause may start about 4 years before menopause  What causes menopause? Menopause starts when the ovaries stop making the female hormones estrogen and progesterone  After menopause, you are no longer able to become pregnant   Any of the following may trigger menopause or early menopause:  Surgery, including a hysterectomy or oophorectomy    Family history of early menopause    Smoking    Chemotherapy or pelvic radiation    Chromosome abnormalities, including Dumont syndrome and Fragile X syndrome    Premature ovarian insufficiency (the ovaries stop producing eggs before age 36)    What are the signs and symptoms of menopause? You may have any of the following:  Irregular menstrual cycles with heavy vaginal bleeding followed by decreased bleeding until it stops    Hot flashes (feeling warm, flushed, and sweaty)    Vaginal changes such as increased dryness    Mood changes such as anxiety, depression, or decreased desire to have sex    Trouble sleeping, joint pain, headaches    Brittle nails, hair on chin or chest where it is normally absent    Decrease in breast size and change in skin texture    Weight gain    How is menopause treated or managed? Hormone replacement therapy (HRT) is medicine that replaces your low hormone levels  HRT contains estrogen and sometimes progestin  HRT has several benefits  HRT helps prevent osteoporosis, which decreases your risk for bone fractures  HRT also protects you from colorectal cancer  HRT also has some risks  HRT increases your risk for breast cancer, blood clots, heart disease, a heart attack, or a stroke  If you are 72 years or older, HRT can also increase your risk for dementia  Your risk for uterine or endometrial cancer is higher if you take estrogen-only HRT  Manage hot flashes  Hot flashes are brief periods of feeling very warm, flushed, and sweaty  Hot flashes can last from a few seconds to several minutes  They may happen many times during the day, and are common at night  Layer your clothing so that you can easily remove some clothing and cool yourself during a hot flash  Cold drinks may also be helpful  Non-hormone medicines can help relieve or prevent hot flashes   Examples include certain antidepressants, nerve medicines, and high blood pressure medicines  Reduce vaginal dryness by using over-the-counter vaginal creams  Vaginal dryness may cause you to have pain or discomfort during sex  Only use creams that are made for vaginal use  Do  not  use petroleum jelly  You may put an estrogen cream in and around your vagina  Estrogen cream may help decrease vaginal dryness and lower your risk of vaginal infections  Continue to use birth control during perimenopause if you do not want to get pregnant  You may need to use birth control until it has been 1 year since your periods stopped  Ask your healthcare provider when you can stop using birth control to prevent pregnancy  How can I live a healthy lifestyle during and after menopause? After menopause, your risk for heart disease and bone loss increases  Ask about these and other ways to stay healthy:  Exercise regularly  Exercise helps you maintain a healthy weight  Exercise can also help to control your blood pressure and cholesterol levels  Include weight-bearing exercise for strong bones  Weight bearing exercise is recommended for at least 30 minutes, 3 times a week  Ask your healthcare provider about the best exercise plan for you  Eat a variety of healthy foods  Include fruits, vegetables, whole grains (whole-wheat bread, pasta, and cereals), low-fat dairy, and lean protein foods (beans, poultry, and fish)  Limit foods high in sodium (salt)  Ask your healthcare provider for more information about a meal plan that is right for you  Do not smoke  If you smoke, it is never too late to quit  You are more likely to have a heart attack, lung disease, blood clots, and cancer if you smoke  Ask your healthcare provider for information if you need help quitting  Take supplements as directed  You may need extra calcium and vitamin D to help prevent osteoporosis  Limit alcohol and caffeine  Alcohol and caffeine may worsen your symptoms  When should I call my doctor? You have vaginal bleeding after menopause  You have questions or concerns about your condition or care  CARE AGREEMENT:   You have the right to help plan your care  Learn about your health condition and how it may be treated  Discuss treatment options with your healthcare providers to decide what care you want to receive  You always have the right to refuse treatment  The above information is an  only  It is not intended as medical advice for individual conditions or treatments  Talk to your doctor, nurse or pharmacist before following any medical regimen to see if it is safe and effective for you  © [facility] 2022 Information is for End User's use only and may not be sold, redistributed or otherwise used for commercial purposes

## 2023-06-02 ENCOUNTER — APPOINTMENT (OUTPATIENT)
Dept: LAB | Facility: CLINIC | Age: 38
End: 2023-06-02
Payer: COMMERCIAL

## 2023-06-02 DIAGNOSIS — R14.0 BLOATING: ICD-10-CM

## 2023-06-02 DIAGNOSIS — Z00.00 ANNUAL PHYSICAL EXAM: ICD-10-CM

## 2023-06-02 DIAGNOSIS — R10.12 LEFT UPPER QUADRANT ABDOMINAL PAIN: ICD-10-CM

## 2023-06-02 LAB
ALBUMIN SERPL BCP-MCNC: 4 G/DL (ref 3.5–5)
ALP SERPL-CCNC: 52 U/L (ref 46–116)
ALT SERPL W P-5'-P-CCNC: 20 U/L (ref 12–78)
ANION GAP SERPL CALCULATED.3IONS-SCNC: 0 MMOL/L (ref 4–13)
AST SERPL W P-5'-P-CCNC: 18 U/L (ref 5–45)
BILIRUB SERPL-MCNC: 0.93 MG/DL (ref 0.2–1)
BUN SERPL-MCNC: 14 MG/DL (ref 5–25)
CALCIUM SERPL-MCNC: 9.4 MG/DL (ref 8.3–10.1)
CHLORIDE SERPL-SCNC: 102 MMOL/L (ref 96–108)
CHOLEST SERPL-MCNC: 140 MG/DL
CO2 SERPL-SCNC: 30 MMOL/L (ref 21–32)
CREAT SERPL-MCNC: 0.92 MG/DL (ref 0.6–1.3)
GFR SERPL CREATININE-BSD FRML MDRD: 79 ML/MIN/1.73SQ M
GLUCOSE P FAST SERPL-MCNC: 94 MG/DL (ref 65–99)
HDLC SERPL-MCNC: 59 MG/DL
LDLC SERPL CALC-MCNC: 72 MG/DL (ref 0–100)
NONHDLC SERPL-MCNC: 81 MG/DL
POTASSIUM SERPL-SCNC: 4.3 MMOL/L (ref 3.5–5.3)
PROT SERPL-MCNC: 7 G/DL (ref 6.4–8.4)
SODIUM SERPL-SCNC: 132 MMOL/L (ref 135–147)
TRIGL SERPL-MCNC: 46 MG/DL

## 2023-06-02 PROCEDURE — 82784 ASSAY IGA/IGD/IGG/IGM EACH: CPT

## 2023-06-02 PROCEDURE — 86258 DGP ANTIBODY EACH IG CLASS: CPT

## 2023-06-02 PROCEDURE — 36415 COLL VENOUS BLD VENIPUNCTURE: CPT

## 2023-06-02 PROCEDURE — 86231 EMA EACH IG CLASS: CPT

## 2023-06-02 PROCEDURE — 80053 COMPREHEN METABOLIC PANEL: CPT

## 2023-06-02 PROCEDURE — 80061 LIPID PANEL: CPT

## 2023-06-02 PROCEDURE — 86364 TISS TRNSGLTMNASE EA IG CLAS: CPT

## 2023-06-03 LAB
ENDOMYSIUM IGA SER QL: NEGATIVE
GLIADIN PEPTIDE IGA SER-ACNC: 5 UNITS (ref 0–19)
GLIADIN PEPTIDE IGG SER-ACNC: 1 UNITS (ref 0–19)
IGA SERPL-MCNC: 159 MG/DL (ref 87–352)
TTG IGA SER-ACNC: <2 U/ML (ref 0–3)
TTG IGG SER-ACNC: <2 U/ML (ref 0–5)

## 2023-06-05 ENCOUNTER — TELEPHONE (OUTPATIENT)
Dept: RHEUMATOLOGY | Facility: CLINIC | Age: 38
End: 2023-06-05

## 2023-06-05 ENCOUNTER — HOSPITAL ENCOUNTER (OUTPATIENT)
Dept: MRI IMAGING | Facility: HOSPITAL | Age: 38
Discharge: HOME | End: 2023-06-05
Payer: COMMERCIAL

## 2023-06-05 DIAGNOSIS — K21.9 GASTROESOPHAGEAL REFLUX DISEASE WITHOUT ESOPHAGITIS: ICD-10-CM

## 2023-06-05 DIAGNOSIS — R10.12 LUQ PAIN: ICD-10-CM

## 2023-06-05 DIAGNOSIS — K21.9 GASTROESOPHAGEAL REFLUX DISEASE WITHOUT ESOPHAGITIS: Primary | ICD-10-CM

## 2023-06-05 DIAGNOSIS — K59.09 OTHER CONSTIPATION: ICD-10-CM

## 2023-06-05 PROCEDURE — A9585 GADOBUTROL INJECTION: HCPCS | Performed by: FAMILY MEDICINE

## 2023-06-05 PROCEDURE — 74183 MRI ABD W/O CNTR FLWD CNTR: CPT

## 2023-06-05 PROCEDURE — G1004 CDSM NDSC: HCPCS

## 2023-06-05 RX ADMIN — GADOBUTROL 5 ML: 604.72 INJECTION INTRAVENOUS at 19:54

## 2023-06-05 NOTE — TELEPHONE ENCOUNTER
----- Message from Ramu Sahni MD sent at 6/5/2023  8:59 AM EDT -----  Please call patient and inform blood test result, celiac panel is normal

## 2023-06-05 NOTE — TELEPHONE ENCOUNTER
I called and spoke to patient regarding results  She states since starting the pantoprazole she has had indigestion and heartburn which she didn't have before  The past 2 days she did not take the pantoprazole and she still had some indigestion and heartburn however not as bad  She wants to know if you want her to try a different medication or what you recommend  Please advise   Thank you

## 2023-06-05 NOTE — TELEPHONE ENCOUNTER
Tell her to try famotidine 40 mg p o  daily    Usually  we recommend to  take pantoprazole for few days continuously before you start see any   response from medication

## 2023-06-05 NOTE — TELEPHONE ENCOUNTER
TRIED TO CALL PT MULTIPLE TIMES THROUGHOUT THE DAY, UNABLE TO LEAVE MESSAGE, WILL CONTINUE TO TRY TO REACH PT

## 2023-06-06 RX ORDER — FAMOTIDINE 40 MG/1
40 TABLET, FILM COATED ORAL DAILY
Qty: 30 TABLET | Refills: 2 | Status: SHIPPED | OUTPATIENT
Start: 2023-06-06 | End: 2023-07-06

## 2023-06-23 ENCOUNTER — HOSPITAL ENCOUNTER (OUTPATIENT)
Dept: MAMMOGRAPHY | Facility: CLINIC | Age: 38
End: 2023-06-23
Payer: COMMERCIAL

## 2023-06-23 VITALS — HEIGHT: 60 IN | WEIGHT: 116 LBS | BODY MASS INDEX: 22.78 KG/M2

## 2023-06-23 DIAGNOSIS — Z12.31 ENCOUNTER FOR SCREENING MAMMOGRAM FOR MALIGNANT NEOPLASM OF BREAST: ICD-10-CM

## 2023-06-23 PROCEDURE — 77067 SCR MAMMO BI INCL CAD: CPT

## 2023-06-23 PROCEDURE — 77063 BREAST TOMOSYNTHESIS BI: CPT

## 2023-07-12 ENCOUNTER — APPOINTMENT (OUTPATIENT)
Dept: LAB | Facility: CLINIC | Age: 38
End: 2023-07-12
Payer: COMMERCIAL

## 2023-07-12 DIAGNOSIS — R63.4 ABNORMAL WEIGHT LOSS: ICD-10-CM

## 2023-07-12 DIAGNOSIS — K21.9 GASTRO-ESOPHAGEAL REFLUX DISEASE WITHOUT ESOPHAGITIS: ICD-10-CM

## 2023-07-12 DIAGNOSIS — R10.84 GENERALIZED ABDOMINAL PAIN: ICD-10-CM

## 2023-07-12 DIAGNOSIS — L30.9 DERMATITIS, UNSPECIFIED: ICD-10-CM

## 2023-07-12 LAB
ANA SER QL IA: NEGATIVE
BASOPHILS # BLD AUTO: 0.03 THOUSANDS/ÂΜL (ref 0–0.1)
BASOPHILS NFR BLD AUTO: 1 % (ref 0–1)
CRP SERPL QL: <3 MG/L
EOSINOPHIL # BLD AUTO: 0.06 THOUSAND/ÂΜL (ref 0–0.61)
EOSINOPHIL NFR BLD AUTO: 1 % (ref 0–6)
ERYTHROCYTE [DISTWIDTH] IN BLOOD BY AUTOMATED COUNT: 13 % (ref 11.6–15.1)
ERYTHROCYTE [SEDIMENTATION RATE] IN BLOOD: 1 MM/HOUR (ref 0–19)
HCT VFR BLD AUTO: 39.4 % (ref 34.8–46.1)
HGB BLD-MCNC: 12.8 G/DL (ref 11.5–15.4)
IMM GRANULOCYTES # BLD AUTO: 0.01 THOUSAND/UL (ref 0–0.2)
IMM GRANULOCYTES NFR BLD AUTO: 0 % (ref 0–2)
IRON SERPL-MCNC: 39 UG/DL (ref 50–170)
LDH SERPL-CCNC: 146 U/L (ref 81–234)
LYMPHOCYTES # BLD AUTO: 1.79 THOUSANDS/ÂΜL (ref 0.6–4.47)
LYMPHOCYTES NFR BLD AUTO: 33 % (ref 14–44)
MCH RBC QN AUTO: 30 PG (ref 26.8–34.3)
MCHC RBC AUTO-ENTMCNC: 32.5 G/DL (ref 31.4–37.4)
MCV RBC AUTO: 92 FL (ref 82–98)
MONOCYTES # BLD AUTO: 0.28 THOUSAND/ÂΜL (ref 0.17–1.22)
MONOCYTES NFR BLD AUTO: 5 % (ref 4–12)
NEUTROPHILS # BLD AUTO: 3.27 THOUSANDS/ÂΜL (ref 1.85–7.62)
NEUTS SEG NFR BLD AUTO: 60 % (ref 43–75)
NRBC BLD AUTO-RTO: 0 /100 WBCS
PLATELET # BLD AUTO: 308 THOUSANDS/UL (ref 149–390)
PMV BLD AUTO: 10.6 FL (ref 8.9–12.7)
RBC # BLD AUTO: 4.27 MILLION/UL (ref 3.81–5.12)
TSH SERPL DL<=0.05 MIU/L-ACNC: 2.21 UIU/ML (ref 0.45–4.5)
WBC # BLD AUTO: 5.44 THOUSAND/UL (ref 4.31–10.16)

## 2023-07-12 PROCEDURE — 85652 RBC SED RATE AUTOMATED: CPT

## 2023-07-12 PROCEDURE — 86140 C-REACTIVE PROTEIN: CPT

## 2023-07-12 PROCEDURE — 83540 ASSAY OF IRON: CPT

## 2023-07-12 PROCEDURE — 86231 EMA EACH IG CLASS: CPT

## 2023-07-12 PROCEDURE — 83516 IMMUNOASSAY NONANTIBODY: CPT

## 2023-07-12 PROCEDURE — 84443 ASSAY THYROID STIM HORMONE: CPT

## 2023-07-12 PROCEDURE — 86663 EPSTEIN-BARR ANTIBODY: CPT

## 2023-07-12 PROCEDURE — 86038 ANTINUCLEAR ANTIBODIES: CPT

## 2023-07-12 PROCEDURE — 86645 CMV ANTIBODY IGM: CPT

## 2023-07-12 PROCEDURE — 86644 CMV ANTIBODY: CPT

## 2023-07-12 PROCEDURE — 83615 LACTATE (LD) (LDH) ENZYME: CPT

## 2023-07-12 PROCEDURE — 36415 COLL VENOUS BLD VENIPUNCTURE: CPT

## 2023-07-12 PROCEDURE — 82785 ASSAY OF IGE: CPT

## 2023-07-12 PROCEDURE — 86664 EPSTEIN-BARR NUCLEAR ANTIGEN: CPT

## 2023-07-12 PROCEDURE — 86235 NUCLEAR ANTIGEN ANTIBODY: CPT

## 2023-07-12 PROCEDURE — 86665 EPSTEIN-BARR CAPSID VCA: CPT

## 2023-07-12 PROCEDURE — 86364 TISS TRNSGLTMNASE EA IG CLAS: CPT

## 2023-07-12 PROCEDURE — 85025 COMPLETE CBC W/AUTO DIFF WBC: CPT

## 2023-07-12 PROCEDURE — 86003 ALLG SPEC IGE CRUDE XTRC EA: CPT

## 2023-07-13 LAB
ALMOND IGE QN: <0.1 KUA/I
CASHEW NUT IGE QN: <0.1 KUA/I
CMV IGG SERPL IA-ACNC: <0.6 U/ML (ref 0–0.59)
CMV IGM SERPL IA-ACNC: <30 AU/ML (ref 0–29.9)
CODFISH IGE QN: <0.1 KUA/I
EBV NA IGG SER IA-ACNC: >600 U/ML (ref 0–17.9)
EBV VCA IGG SER IA-ACNC: >600 U/ML (ref 0–17.9)
EBV VCA IGM SER IA-ACNC: <36 U/ML (ref 0–35.9)
EGG WHITE IGE QN: <0.1 KUA/I
ENA SCL70 AB SER-ACNC: <0.2 AI (ref 0–0.9)
ENA SS-A AB SER-ACNC: <0.2 AI (ref 0–0.9)
ENA SS-B AB SER-ACNC: <0.2 AI (ref 0–0.9)
ENDOMYSIUM IGA SER QL: NEGATIVE
GLIADIN PEPTIDE IGA SER-ACNC: 4 UNITS (ref 0–19)
GLIADIN PEPTIDE IGG SER-ACNC: 2 UNITS (ref 0–19)
GLUTEN IGE QN: <0.1 KUA/I
HAZELNUT IGE QN: <0.1 KUA/L
INTERPRETATION: ABNORMAL
MILK IGE QN: <0.1 KUA/I
PEANUT IGE QN: <0.1 KUA/I
SALMON IGE QN: <0.1 KUA/I
SCALLOP IGE QN: <0.1 KUA/L
SESAME SEED IGE QN: <0.1 KUA/I
SHRIMP IGE QN: <0.1 KUA/L
SOYBEAN IGE QN: <0.1 KUA/I
TOTAL IGE SMQN RAST: 2.87 KU/L (ref 0–113)
TTG IGA SER-ACNC: <2 U/ML (ref 0–3)
TUNA IGE QN: <0.1 KUA/I
WALNUT IGE QN: <0.1 KUA/I
WHEAT IGE QN: <0.1 KUA/I

## 2023-07-18 ENCOUNTER — HOSPITAL ENCOUNTER (OUTPATIENT)
Dept: RADIOLOGY | Facility: HOSPITAL | Age: 38
Discharge: HOME | End: 2023-07-18
Payer: COMMERCIAL

## 2023-07-18 DIAGNOSIS — K21.9 GASTRO-ESOPHAGEAL REFLUX DISEASE WITHOUT ESOPHAGITIS: ICD-10-CM

## 2023-07-18 PROCEDURE — 74220 X-RAY XM ESOPHAGUS 1CNTRST: CPT

## 2023-07-26 ENCOUNTER — HOSPITAL ENCOUNTER (OUTPATIENT)
Dept: CT IMAGING | Facility: HOSPITAL | Age: 38
Discharge: HOME | End: 2023-07-26
Attending: INTERNAL MEDICINE
Payer: COMMERCIAL

## 2023-07-26 DIAGNOSIS — R10.84 GENERALIZED ABDOMINAL PAIN: ICD-10-CM

## 2023-07-26 PROCEDURE — G1004 CDSM NDSC: HCPCS

## 2023-07-26 PROCEDURE — 74177 CT ABD & PELVIS W/CONTRAST: CPT

## 2023-07-26 RX ADMIN — IOHEXOL 75 ML: 350 INJECTION, SOLUTION INTRAVENOUS at 11:07

## 2024-03-06 ENCOUNTER — TELEPHONE (OUTPATIENT)
Dept: GASTROENTEROLOGY | Facility: CLINIC | Age: 39
End: 2024-03-06

## 2024-03-06 NOTE — TELEPHONE ENCOUNTER
I called and spoke to patient. She states she does not need a f/u appt, she is going somewhere else.

## 2024-05-31 PROBLEM — K29.70 GASTRITIS: Status: ACTIVE | Noted: 2023-05-18

## 2024-05-31 NOTE — PATIENT INSTRUCTIONS
Breast Self Exam for Women   AMBULATORY CARE:   A breast self-exam (BSE)  is a way to check your breasts for lumps and other changes. Regular BSEs can help you know how your breasts normally look and feel. Most breast lumps or changes are not cancer, but you should always have them checked by a healthcare provider.  Why you should do a BSE:  Breast cancer is the most common type of cancer in women. Even if you have mammograms, you may still want to do a BSE regularly. If you know how your breasts normally feel and look, it may help you know when to contact your healthcare provider. Mammograms can miss some cancers. You may find a lump during a BSE that did not show up on a mammogram.  When you should do a BSE:  If you have periods, you may want to do your BSE 1 week after your period ends. This is the time when your breasts may be the least swollen, lumpy, or tender. You can do regular BSEs even if you are breastfeeding or have breast implants.  Call your doctor if:   You find any lumps or changes in your breasts.    You have breast pain or fluid coming from your nipples.    You have questions or concerns about your condition or care.    How to do a BSE:       Look at your breasts in a mirror.  Look at the size and shape of each breast and nipple. Check for swelling, lumps, dimpling, scaly skin, or other skin changes. Look for nipple changes, such as a nipple that is painful or beginning to pull inward. Gently squeeze both nipples and check to see if fluid (that is not breast milk) comes out of them. If you find any of these or other breast changes, contact your healthcare provider. Check your breasts while you sit or  the following 3 positions:    Hang your arms down at your sides.    Raise your hands and join them behind your head.    Put firm pressure with your hands on your hips. Bend slightly forward while you look at your breasts in the mirror.    Lie down and feel your breasts.  When you lie down,  your breast tissue spreads out evenly over your chest. This makes it easier for you to feel for lumps and anything that may not be normal for your breasts. Do a BSE on one breast at a time.    Place a small pillow or towel under your left shoulder.  Put your left arm behind your head.    Use the 3 middle fingers of your right hand.  Use your fingertip pads, on the top of your fingers. Your fingertip pad is the most sensitive part of your finger.    Use small circles to feel your breast tissue.  Use your fingertip pads to make dime-sized, overlapping circles on your breast and armpits. Use light, medium, and firm pressure. First, press lightly. Second, press with medium pressure to feel a little deeper into the breast. Last, use firm pressure to feel deep within your breast.    Examine your entire breast area.  Examine the breast area from above the breast to below the breast where you feel only ribs. Make small circles with your fingertips, starting in the middle of your armpit. Make circles going up and down the breast area. Continue toward your breast and all the way across it. Examine the area from your armpit all the way over to the middle of your chest (breastbone). Stop at the middle of your chest.    Move the pillow or towel to your right shoulder, and put your right arm behind your head.  Use the 3 fingertip pads of your left hand, and repeat the above steps to do a BSE on your right breast.  What else you can do to check for breast problems or cancer:  Talk to your healthcare provider about mammograms. A mammogram is an x-ray of your breasts to screen for breast cancer or other problems. Your provider can tell you the benefits and risks of mammograms. The first mammogram is usually at age 45 or 50. Your provider may recommend you start at 40 or younger if your risk for breast cancer is high. Mammograms usually continue every 1 to 2 years until age 74.       Follow up with your doctor as directed:  Write  down your questions so you remember to ask them during your visits.  © Copyright [employer] 2023 Information is for End User's use only and may not be sold, redistributed or otherwise used for commercial purposes.  The above information is an  only. It is not intended as medical advice for individual conditions or treatments. Talk to your doctor, nurse or pharmacist before following any medical regimen to see if it is safe and effective for you.  Wellness Visit for Adults   AMBULATORY CARE:   A wellness visit  is when you see your healthcare provider to get screened for health problems. Your healthcare provider will also give you advice on how to stay healthy. Write down your questions so you remember to ask them. Ask your healthcare provider how often you should have a wellness visit.  What happens at a wellness visit:  Your healthcare provider will ask about your health, and your family history of health problems. This includes high blood pressure, heart disease, and cancer. He or she will ask if you have symptoms that concern you, if you smoke, and about your mood. You may also be asked about your intake of medicines, supplements, food, and alcohol. Any of the following may be done:  Your weight  will be checked. Your height may also be checked so your body mass index (BMI) can be calculated. Your BMI shows if you are at a healthy weight.    Your blood pressure  and heart rate will be checked. Your temperature may also be checked.    Blood and urine tests  may be done. Blood tests may be done to check your cholesterol levels. Abnormal cholesterol levels increase your risk for heart disease and stroke. You may also need a blood or urine test to check for diabetes if you are at increased risk. Urine tests may be done to look for signs of an infection or kidney disease.    A physical exam  includes checking your heartbeat and lungs with a stethoscope. Your healthcare provider may also check your skin to  look for sun damage.    Screening tests  may be recommended. A screening test is done to check for diseases that may not cause symptoms. The screening tests you may need depend on your age, gender, family history, and lifestyle habits. For example, colorectal screening may be recommended if you are 50 years old or older.    Screening tests you need if you are a woman:   A Pap smear  is used to screen for cervical cancer. Pap smears are usually done every 3 to 5 years depending on your age. You may need them more often if you have had abnormal Pap smear test results in the past. Ask your healthcare provider how often you should have a Pap smear.    A mammogram  is an x-ray of your breasts to screen for breast cancer. Experts recommend mammograms every 2 years starting at age 50 years. You may need a mammogram at age 49 years or younger if you have an increased risk for breast cancer. Talk to your healthcare provider about when you should start having mammograms and how often you need them.    Vaccines you may need:   Get an influenza vaccine  every year. The influenza vaccine protects you from the flu. Several types of viruses cause the flu. The viruses change over time, so new vaccines are made each year.    Get a tetanus-diphtheria (Td) booster vaccine  every 10 years. This vaccine protects you against tetanus and diphtheria. Tetanus is a severe infection that may cause painful muscle spasms and lockjaw. Diphtheria is a severe bacterial infection that causes a thick covering in the back of your mouth and throat.    Get a human papillomavirus (HPV) vaccine  if you are female and aged 19 to 26 or male 19 to 21 and never received it. This vaccine protects you from HPV infection. HPV is the most common infection spread by sexual contact. HPV may also cause vaginal, penile, and anal cancers.    Get a pneumococcal vaccine  if you are aged 65 years or older. The pneumococcal vaccine is an injection given to protect you  from pneumococcal disease. Pneumococcal disease is an infection caused by pneumococcal bacteria. The infection may cause pneumonia, meningitis, or an ear infection.    Get a shingles vaccine  if you are 60 or older, even if you have had shingles before. The shingles vaccine is an injection to protect you from the varicella-zoster virus. This is the same virus that causes chickenpox. Shingles is a painful rash that develops in people who had chickenpox or have been exposed to the virus.    How to eat healthy:  My Plate is a model for planning healthy meals. It shows the types and amounts of foods that should go on your plate. Fruits and vegetables make up about half of your plate, and grains and protein make up the other half. A serving of dairy is included on the side of your plate. The amount of calories and serving sizes you need depends on your age, gender, weight, and height. Examples of healthy foods are listed below:  Eat a variety of vegetables  such as dark green, red, and orange vegetables. You can also include canned vegetables low in sodium (salt) and frozen vegetables without added butter or sauces.    Eat a variety of fresh fruits , canned fruit in 100% juice, frozen fruit, and dried fruit.    Include whole grains.  At least half of the grains you eat should be whole grains. Examples include whole-wheat bread, wheat pasta, brown rice, and whole-grain cereals such as oatmeal.    Eat a variety of protein foods such as seafood (fish and shellfish), lean meat, and poultry without skin (turkey and chicken). Examples of lean meats include pork leg, shoulder, or tenderloin, and beef round, sirloin, tenderloin, and extra lean ground beef. Other protein foods include eggs and egg substitutes, beans, peas, soy products, nuts, and seeds.    Choose low-fat dairy products such as skim or 1% milk or low-fat yogurt, cheese, and cottage cheese.    Limit unhealthy fats  such as butter, hard margarine, and shortening.        Exercise:  Exercise at least 30 minutes per day on most days of the week. Some examples of exercise include walking, biking, dancing, and swimming. You can also fit in more physical activity by taking the stairs instead of the elevator or parking farther away from stores. Include muscle strengthening activities 2 days each week. Regular exercise provides many health benefits. It helps you manage your weight, and decreases your risk for type 2 diabetes, heart disease, stroke, and high blood pressure. Exercise can also help improve your mood. Ask your healthcare provider about the best exercise plan for you.       General health and safety guidelines:   Do not smoke.  Nicotine and other chemicals in cigarettes and cigars can cause lung damage. Ask your healthcare provider for information if you currently smoke and need help to quit. E-cigarettes or smokeless tobacco still contain nicotine. Talk to your healthcare provider before you use these products.    Limit alcohol.  A drink of alcohol is 12 ounces of beer, 5 ounces of wine, or 1½ ounces of liquor.    Lose weight, if needed.  Being overweight increases your risk of certain health conditions. These include heart disease, high blood pressure, type 2 diabetes, and certain types of cancer.    Protect your skin.  Do not sunbathe or use tanning beds. Use sunscreen with a SPF 15 or higher. Apply sunscreen at least 15 minutes before you go outside. Reapply sunscreen every 2 hours. Wear protective clothing, hats, and sunglasses when you are outside.    Drive safely.  Always wear your seatbelt. Make sure everyone in your car wears a seatbelt. A seatbelt can save your life if you are in an accident. Do not use your cell phone when you are driving. This could distract you and cause an accident. Pull over if you need to make a call or send a text message.    Practice safe sex.  Use latex condoms if are sexually active and have more than one partner. Your healthcare  provider may recommend screening tests for sexually transmitted infections (STIs).    Wear helmets, lifejackets, and protective gear.  Always wear a helmet when you ride a bike or motorcycle, go skiing, or play sports that could cause a head injury. Wear protective equipment when you play sports. Wear a lifejacket when you are on a boat or doing water sports.    © Copyright [employer] 2023 Information is for End User's use only and may not be sold, redistributed or otherwise used for commercial purposes.  The above information is an  only. It is not intended as medical advice for individual conditions or treatments. Talk to your doctor, nurse or pharmacist before following any medical regimen to see if it is safe and effective for you.  HPV (Human Papillomavirus)   AMBULATORY CARE:   Human Papillomavirus (HPV)  is the name for a group of viruses that can infect your skin or other parts of your body. HPV is the most common infection spread by sexual contact. It can also be spread from a mother to her baby during delivery.  Common symptoms include the following:   Painless warts in your mouth or on your genitals    Genital or anal discharge, bleeding, itching, or pain    Pain when you urinate    Call your doctor if:   You have new or worsening symptoms.    You have questions or concerns about your condition or care.    HPV diagnosis:  Your healthcare provider may use a vinegar liquid to help diagnose HPV genital warts. Women 30 to 65 years old can be checked for HPV during regular cervical cancer screenings. An HPV test checks for certain types of HPV that can cause changes in cervical cells. Without treatment, the changed cells can become cancer. An HPV test can be done every 5 years if the results show no infection. The test can be done with or without a Pap smear. A Pap smear checks for cancer or for abnormal cells that can become cancer. You may be tested for HPV if you have mouth or throat  cancer.  Treatment:  HPV cannot be cured, but an infection may go away on its own in about 2 years without causing problems. If the infection continues, some types of HPV can lead to health conditions that need to be treated. Examples include warts and certain cancers, especially squamous cell carcinoma (SCC). HPV-linked SCCs commonly develop in the anus, throat (called oropharyngeal cancer), cervix, vagina, penis, or mouth. HPV can also cause a type of cervical cancer called adenocarcinoma. Symptoms of any of these conditions may not develop for several years after you were exposed to HPV. You will need to be monitored closely. Ask your healthcare provider for more information about monitoring, conditions caused by HPV, and available treatments.  Prevent an HPV infection:  HPV is usually spread through sexual activity. The following can help prevent infection:  Ask about the HPV vaccine.  The HPV vaccine is given to females and males, usually at 11 or 12 years of age. It can be given from 9 years through 45 years of age, if needed. It is most effective if given before sexual activity begins.         Use a new condom, contraceptive barrier, or dental dam each time you have sex.  This includes oral, vaginal, and anal sex. Talk to your healthcare provider if you have any questions about what to use or how to use it.    Follow up with your doctor as directed:  Write down your questions so you remember to ask them during your visits.  © Copyright [employer] 2023 Information is for End User's use only and may not be sold, redistributed or otherwise used for commercial purposes.  The above information is an  only. It is not intended as medical advice for individual conditions or treatments. Talk to your doctor, nurse or pharmacist before following any medical regimen to see if it is safe and effective for you.  Kegel Exercises for Women   AMBULATORY CARE:   Kegel exercises  help strengthen your pelvic  muscles. Pelvic muscles hold your pelvic organs, such as your bladder and uterus, in place. Kegel exercises help prevent or control certain conditions, such as urine incontinence (leakage) or uterine prolapse.       Call your doctor or physical therapist if:   You cannot feel your muscles tighten or relax.    You continue to leak urine.    You have questions or concerns about your condition or care.    Use the correct muscles:  Pelvic muscles are the muscles you use to control urine flow. To target these muscles, stop and start the flow of urine several times. This will help you become familiar with how it feels to tighten and relax these muscles.  How to do Kegel exercises:   Get into a comfortable position.  You may lie down, stand up, or sit down to do these exercises. When you first try to do these exercises, it may be easier if you lie down.    Tighten or squeeze your pelvic muscles slowly.  It may feel like you are trying to hold back urine or gas. Hold this position for 3 seconds. Relax for 3 seconds. Repeat this cycle 10 times. Do not hold your breath when you do Kegel exercises. Keep your stomach, back, and leg muscles relaxed.    Do 10 sets of Kegel exercises, at least 3 times a day.  When you know how to do Kegel exercises, use different positions. This will help to strengthen your pelvic muscles as much as possible. You can do these exercises while you lie on the floor, watch TV, or while you stand. Tighten your pelvic muscles before you sneeze, cough, or lift to prevent urine leakage. You may notice improved bladder control within about 6 weeks.    Follow up with your doctor or physical therapist as directed:  Write down your questions so you remember to ask them during your visits.  © Copyright [employer] 2023 Information is for End User's use only and may not be sold, redistributed or otherwise used for commercial purposes.  The above information is an  only. It is not intended as medical  advice for individual conditions or treatments. Talk to your doctor, nurse or pharmacist before following any medical regimen to see if it is safe and effective for you.       Perineal Hygiene      Your vaginal naturally takes care of its self, it is a self washing system, the less you mess the healthier it will be     No soaps or feminine wash to the vulva, these products can cause dermitis, bacterial infections and other vulvar problems.   Use only water to cleanse, or water with Dove or Dove Sensitive Skin Bar soap if necessary.    No scented lotions or products are advised in or near your vulva.    Use only coconut oil for moisture if needed.  No douching this may cause imbalance in your vaginal PH and further issues.    If you wear panty liners, you may apply a thin coating of Vaseline, A&D ointment or coconut oil to the vulvar tissues as a skin barrier     Cotton underware, loose fitting clothing  Only perfume-free, dye-free laundry detergent, use a second rinse cycle   Avoid fabric softeners/dryer sheets.       Your partner should avoid the same products as well.       Over the counter probiotic to restore vaginal samia may be helpful as well, take daily.       You may also look into Boric Acid vaginal suppositories to restore vaginal PH balance for up to 2 weeks as directed on the box. You may not use these if you are pregnant      For vaginal dryness:      You may use:     Coconut oil (organic, pure, unscented) as needed for moisture or lubrication. ( Do not use if allergic)       Replens moisture restore external comfort gel daily ( use as directed on the box)        Replens long lasting vaginal moisturizer  ( use as directed on the box)         For Vaginal Lubrication:          You may use:     Coconut oil (organic, pure, unscented) as a lubricant or another scent-free lubricant (Astroglide, Uberlube) if needed.  Do not use coconut oil or silicone if using a condom as this may break down the integrity of  the condom and cause an unplanned pregnancy              Do not use coconut oil if allergic               Replens silky smooth lubricant, premium silicone based lubricant for intercourse. ( use as directed, a small amount will provide an enhanced natural feeling)     Any premium over the counter vaginal lubricant water or silicone based. Silicone based will have more staying power.

## 2024-05-31 NOTE — PROGRESS NOTES
Diagnoses and all orders for this visit:    Encounter for gynecological examination without abnormal finding    Encounter for screening mammogram for malignant neoplasm of breast  -     Mammo screening bilateral w 3d & cad; Future      Perineal hygiene reviewed   Weight bearing exercises minium of 150 mins/weekly advised.   Kegel exercises recommended daily, see AVS for instructions and recommendations  SBE encouraged, ASCCP guidelines reviewed. Condoms encouraged with all sexual activity to prevent STI's.   Gardisil vaccines recommended up to age 45  Calcium/ Vit D dietary requirements discussed,   Advised to call with any issues,  all concerns & questions addressed.   See after visit summary for further information and recommendations to the above mentioned subjects which we may or may not have covered in detail during your visit     F/U Annually and PRN      Health Maintenance:    Last PAP: 01/10/2023 Neg/Neg  Next PAP Due:     Last Mammogram: 2023  Lifetime Tyrer-Cuzick: 17.56 %, extremely dense, Bi RADS 1  Order given for this year       Last Colonoscopy: 2023   RTO 10 years    Gardisil: Not completed Gardasil 9 was advised for prevention of HPV-related disease. We discussed risks/benefits, SE's/AE's at length and all questions were answered. Written info was provided for review. The patient agrees to consider and is aware she may call to schedule injection #1 at any time.        Subjective    CC: Yearly Exam      Alena Hood is a 38 y.o. female here for an annual exam.   GYN hx includes:   1  , 1 vaginal delivery, hx of GDM, post coital bleeding    No personal Hx of breast, cervical, ovarian or colon CA.   Family hx of: Mother with Triple Neg BC, recent diagnosis , PA with BC   Mother had genetic testing, not hereditary, no need for pt to be tested   She does not want to consult with breast surgeon    Medically stable, follows with PMD  Has made radical changes in her diet  d/t GI issues, she has food sensitivities to 37 foods,   She is feeling much better with these changes     Patient's last menstrual period was 2024 (exact date).  Her menstrual cycles are regular every 28-30 days. She denies issues with bleeding during her menses.  Post COVID she did have some irregular cycles hair loss and postcoital bleeding which have all resolved.  Denies history of abnormal pap smear.  She denies breast concerns, abnormal vaginal discharge, vaginal itching, odor, irritation, bowel/bladder dysfunction, urinary symptoms, pelvic pain, or dyspareunia today.   She is sexually active. Monogamous relationship.    Her current method of contraception includes male vasectomy . Denies any issues with her BCM.   She does not want STD testing today.  Denies intimate partner violence    Works at [employer]   Past Medical History:   Diagnosis Date    Gestational diabetes     Type A blood, Rh negative     Varicella      Past Surgical History:   Procedure Laterality Date    APPENDECTOMY       SECTION      WISDOM TOOTH EXTRACTION         Immunization History   Administered Date(s) Administered    Influenza Quadrivalent Preservative Free 3 years and older IM 2015    Tdap 10/04/2010, 2015       Family History   Problem Relation Age of Onset    BRCA2 Negative Mother     BRCA1 Negative Mother     Breast cancer Mother 64    Hypertension Father     No Known Problems Sister     No Known Problems Daughter     No Known Problems Son     Diabetes Maternal Grandmother     Hypertension Paternal Grandmother     Hypertension Paternal Grandfather     Diabetes Maternal Aunt     Diabetes Maternal Uncle     Breast cancer Paternal Aunt      Social History     Tobacco Use    Smoking status: Never    Smokeless tobacco: Never   Vaping Use    Vaping status: Never Used   Substance Use Topics    Alcohol use: Not Currently     Comment: rare    Drug use: Never       Current Outpatient  Medications:     Ascorbic Acid (vitamin C) 1000 MG tablet, , Disp: , Rfl:     B Complex-C-Folic Acid (Super B-Complex/Vit C/FA) TABS, , Disp: , Rfl:     Omega-3 Fatty Acids (Fish Oil) 1200 MG CAPS, , Disp: , Rfl:     Zinc 50 MG CAPS, , Disp: , Rfl:   Patient Active Problem List    Diagnosis Date Noted    Gastritis 2023    Bilateral breast lump 2022    Gastroesophageal reflux disease without esophagitis 2022    Other constipation 2022    Increased frequency of urination 2022    Hirsutism 2021       Allergies   Allergen Reactions    Codeine Vomiting     Reaction Date: 2012;        OB History    Para Term  AB Living   2 2 2     2   SAB IAB Ectopic Multiple Live Births           2      # Outcome Date GA Lbr Floyd/2nd Weight Sex Type Anes PTL Lv   2 Term      CS-LTranv   DIONI   1 Term      Vag-Spont   DIONI      Obstetric Comments   1 vaginal delivery  & C- section        Vitals:    24 0843   BP: 118/70   BP Location: Left arm   Patient Position: Sitting   Cuff Size: Large   Weight: 54 kg (119 lb)   Height: 5' (1.524 m)     Body mass index is 23.24 kg/m².    Review of Systems     Constitutional: Negative for chills, fatigue, fever, headaches, visual disturbances, and unexpected weight change.   Respiratory: Negative for cough, & shortness of breath.  Cardiovascular: Negative for chest pain. .    Gastrointestinal: Negative for Abd pain, nausea & vomiting, constipation and diarrhea.   Genitourinary: Negative for difficulty urinating, dysuria, hematuria, unusual vaginal bleeding or discharge  Skin: Negative skin changes    Physical Exam     Constitutional: Alert & Oriented x3, well-developed and well-nourished. No distress.   HENT: Atraumatic, Normocephalic, Conjunctivae clear  Neck: Normal range of motion. Neck supple. No thyromegaly, mass, nodules or tenderness  Pulmonary: Effort normal.   Abdominal: Soft. No tenderness or masses  Musculoskeletal: Normal ROM  Skin:  Warm & Dry  Psychological: Normal mood, thought content, behavior & judgement     Breasts:   Right: tissue soft without masses, tenderness, skin changes or nipple discharge. No areas of erythema or pain. No subclavicular, axillary, pectoral adenopathy  Left:  tissue soft without masses, tenderness, skin changes or nipple discharge. No areas of erythema or pain. No subclavicular, axillary, pectoral adenopathy    Pelvic exam was performed with patient supine, lithotomy position.      Labia: Negative rash, tenderness, lesion or injury on the right labia.              Negative rash, tenderness, lesion or injury on the left labia.   Urethral meatus:  Negative for  tenderness, inflammation or discharge.   Uterus: not deviated, enlarged, fixed or tender.   Cervix: No CMT, no discharge or friability.   Right adnexa: no mass, no tenderness and no fullness.  Left adnexa: no mass, no tenderness and no fullness.   Vagina: No erythema, tenderness, masses, or foreign body in the vagina. No signs of injury around the vagina. No unusual vaginal discharge   Perineum without lesions, signs of injury, erythema or swelling.  Inguinal Canal:        Right: No inguinal adenopathy or hernia present.        Left: No inguinal adenopathy or hernia present.

## 2024-06-04 ENCOUNTER — ANNUAL EXAM (OUTPATIENT)
Dept: OBGYN CLINIC | Facility: CLINIC | Age: 39
End: 2024-06-04
Payer: COMMERCIAL

## 2024-06-04 VITALS
WEIGHT: 119 LBS | DIASTOLIC BLOOD PRESSURE: 70 MMHG | BODY MASS INDEX: 23.36 KG/M2 | SYSTOLIC BLOOD PRESSURE: 118 MMHG | HEIGHT: 60 IN

## 2024-06-04 DIAGNOSIS — Z01.419 ENCOUNTER FOR GYNECOLOGICAL EXAMINATION WITHOUT ABNORMAL FINDING: Primary | ICD-10-CM

## 2024-06-04 DIAGNOSIS — Z12.31 ENCOUNTER FOR SCREENING MAMMOGRAM FOR MALIGNANT NEOPLASM OF BREAST: ICD-10-CM

## 2024-06-04 PROCEDURE — S0612 ANNUAL GYNECOLOGICAL EXAMINA: HCPCS | Performed by: OBSTETRICS & GYNECOLOGY

## 2025-07-01 ENCOUNTER — OFFICE VISIT (OUTPATIENT)
Dept: URGENT CARE | Facility: CLINIC | Age: 40
End: 2025-07-01
Payer: COMMERCIAL

## 2025-07-01 VITALS
DIASTOLIC BLOOD PRESSURE: 60 MMHG | SYSTOLIC BLOOD PRESSURE: 122 MMHG | HEART RATE: 83 BPM | OXYGEN SATURATION: 98 % | RESPIRATION RATE: 18 BRPM | WEIGHT: 128 LBS | BODY MASS INDEX: 25.13 KG/M2 | HEIGHT: 60 IN

## 2025-07-01 DIAGNOSIS — R09.82 POSTNASAL DRIP: Primary | ICD-10-CM

## 2025-07-01 PROCEDURE — 99213 OFFICE O/P EST LOW 20 MIN: CPT | Performed by: PHYSICIAN ASSISTANT

## 2025-07-01 RX ORDER — GENTAMICIN SULFATE 3 MG/ML
1 SOLUTION/ DROPS OPHTHALMIC
COMMUNITY
Start: 2025-06-25

## 2025-07-01 NOTE — PROGRESS NOTES
[facility]  Name: Alena Hood      : 1985      MRN: [id number]  Encounter Provider: Shweta Gómez PA-C  Encounter Date: 2025   Encounter department: [facility] [place]  :  Assessment & Plan  Postnasal drip  Postnasal drip causing sore throat and cough  Likely from seasonal allergies as no other symptoms  Recommend daily Claritin/Zyrtec along with Flonase nasal spray       Follow up with PCP in 3-5 days.  Proceed to  ER if symptoms worsen.    If tests are performed, our office will contact you with results only if changes need to made to the care plan discussed with you at the visit. You can review your full results on [facility].    Chief Complaint:   Chief Complaint   Patient presents with    Cold Like Symptoms     C/O sore throat on and off, cough, chest congestion, and post nasal drip for nine days. Used OTC cold medication.     History of Present Illness   Alena is a 39-year-old female whom presents to the clinic complaining of sore throat x 9 days.  She states the sore throat is intermittent along with a cough that is occasionally productive with a yellow to green sputum.  Also notes postnasal drip and mild rhinorrhea and nasal congestion.  She denies any fever, chills, fatigue, myalgias, ear pain, shortness of breath, vomiting, diarrhea, loss of taste or smell, recent travel, or any known sick contacts.      History obtained from: patient    Review of Systems   Constitutional:  Negative for chills, fatigue and fever.   HENT:  Positive for congestion, rhinorrhea and sore throat. Negative for ear pain, sinus pressure and sinus pain.    Respiratory:  Positive for cough. Negative for shortness of breath.    Gastrointestinal:  Positive for nausea. Negative for diarrhea and vomiting.   Musculoskeletal:  Negative for myalgias.   Neurological:  Negative for headaches.     Past Medical History   Past Medical History[1]  Past Surgical History[2]  Family History[3]  she  "reports that she has never smoked. She has never used smokeless tobacco. She reports that she does not currently use alcohol. She reports that she does not use drugs.  Current Outpatient Medications   Medication Instructions    Ascorbic Acid (vitamin C) 1000 MG tablet No dose, route, or frequency recorded.    B Complex-C-Folic Acid (Super B-Complex/Vit C/FA) TABS No dose, route, or frequency recorded.    gentamicin (GARAMYCIN) 0.3 % ophthalmic solution 1 drop, 6 times daily    Omega-3 Fatty Acids (Fish Oil) 1200 MG CAPS No dose, route, or frequency recorded.    Zinc 50 MG CAPS No dose, route, or frequency recorded.   Allergies[4]     Objective   /60   Pulse 83   Resp 18   Ht 5' (1.524 m)   Wt 58.1 kg (128 lb)   LMP 07/01/2025 (Exact Date)   SpO2 98%   BMI 25.00 kg/m²      Physical Exam  Vitals and nursing note reviewed.   Constitutional:       General: She is not in acute distress.     Appearance: Normal appearance. She is not ill-appearing.   HENT:      Right Ear: Tympanic membrane, ear canal and external ear normal.      Left Ear: Tympanic membrane normal.      Nose: Congestion present.      Mouth/Throat:      Mouth: Mucous membranes are moist.      Pharynx: No oropharyngeal exudate or posterior oropharyngeal erythema.     Cardiovascular:      Rate and Rhythm: Normal rate and regular rhythm.      Heart sounds: Normal heart sounds.   Pulmonary:      Effort: Pulmonary effort is normal.      Breath sounds: Normal breath sounds.   Lymphadenopathy:      Cervical: No cervical adenopathy.     Neurological:      Mental Status: She is alert and oriented to person, place, and time.     Psychiatric:         Mood and Affect: Mood normal.         Behavior: Behavior normal.         Portions of the record may have been created with voice recognition software.  Occasional wrong word or \"sound a like\" substitutions may have occurred due to the inherent limitations of voice recognition software.  Read the chart " carefully and recognize, using context, where substitutions have occurred.       [1]   Past Medical History:  Diagnosis Date    Gestational diabetes     Type A blood, Rh negative     Varicella    [2]   Past Surgical History:  Procedure Laterality Date    APPENDECTOMY       SECTION      WISDOM TOOTH EXTRACTION     [3]   Family History  Problem Relation Name Age of Onset    BRCA2 Negative Mother mother     BRCA1 Negative Mother mother     Breast cancer Mother mother 64    Hypertension Father Father     No Known Problems Sister      No Known Problems Daughter      No Known Problems Son      Diabetes Maternal Grandmother Grandmother     Hypertension Paternal Grandmother      Hypertension Paternal Grandfather      Diabetes Maternal Aunt      Diabetes Maternal Uncle      Breast cancer Paternal Aunt     [4]   Allergies  Allergen Reactions    Codeine Vomiting     Reaction Date: 2012;